# Patient Record
Sex: MALE | Race: ASIAN | NOT HISPANIC OR LATINO | Employment: FULL TIME | URBAN - METROPOLITAN AREA
[De-identification: names, ages, dates, MRNs, and addresses within clinical notes are randomized per-mention and may not be internally consistent; named-entity substitution may affect disease eponyms.]

---

## 2018-06-29 ENCOUNTER — OFFICE VISIT - HEALTHEAST (OUTPATIENT)
Dept: FAMILY MEDICINE | Facility: CLINIC | Age: 53
End: 2018-06-29

## 2018-06-29 DIAGNOSIS — Z00.8 ENCOUNTER FOR BIOMETRIC SCREENING: ICD-10-CM

## 2018-06-29 LAB
ALBUMIN SERPL-MCNC: 4.1 G/DL (ref 3.5–5)
ALP SERPL-CCNC: 77 U/L (ref 45–120)
ALT SERPL W P-5'-P-CCNC: 36 U/L (ref 0–45)
ANION GAP SERPL CALCULATED.3IONS-SCNC: 11 MMOL/L (ref 5–18)
AST SERPL W P-5'-P-CCNC: 29 U/L (ref 0–40)
BILIRUB SERPL-MCNC: 0.3 MG/DL (ref 0–1)
BUN SERPL-MCNC: 18 MG/DL (ref 8–22)
CALCIUM SERPL-MCNC: 9.3 MG/DL (ref 8.5–10.5)
CHLORIDE BLD-SCNC: 110 MMOL/L (ref 98–107)
CHOLEST SERPL-MCNC: 133 MG/DL
CO2 SERPL-SCNC: 22 MMOL/L (ref 22–31)
CREAT SERPL-MCNC: 0.97 MG/DL (ref 0.7–1.3)
FASTING STATUS PATIENT QL REPORTED: NO
GFR SERPL CREATININE-BSD FRML MDRD: >60 ML/MIN/1.73M2
GLUCOSE BLD-MCNC: 88 MG/DL (ref 70–125)
HDLC SERPL-MCNC: 43 MG/DL
LDLC SERPL CALC-MCNC: 75 MG/DL
POTASSIUM BLD-SCNC: 4 MMOL/L (ref 3.5–5)
PROT SERPL-MCNC: 7.4 G/DL (ref 6–8)
SODIUM SERPL-SCNC: 143 MMOL/L (ref 136–145)
TRIGL SERPL-MCNC: 76 MG/DL

## 2018-06-29 ASSESSMENT — MIFFLIN-ST. JEOR: SCORE: 1501.98

## 2018-07-02 ENCOUNTER — COMMUNICATION - HEALTHEAST (OUTPATIENT)
Dept: FAMILY MEDICINE | Facility: CLINIC | Age: 53
End: 2018-07-02

## 2019-07-01 ENCOUNTER — OFFICE VISIT - HEALTHEAST (OUTPATIENT)
Dept: FAMILY MEDICINE | Facility: CLINIC | Age: 54
End: 2019-07-01

## 2019-07-01 ENCOUNTER — COMMUNICATION - HEALTHEAST (OUTPATIENT)
Dept: TELEHEALTH | Facility: CLINIC | Age: 54
End: 2019-07-01

## 2019-07-01 DIAGNOSIS — Z00.8 ENCOUNTER FOR BIOMETRIC SCREENING: ICD-10-CM

## 2019-07-01 DIAGNOSIS — Z00.00 ROUTINE GENERAL MEDICAL EXAMINATION AT A HEALTH CARE FACILITY: ICD-10-CM

## 2019-07-01 LAB
ALBUMIN SERPL-MCNC: 4.1 G/DL (ref 3.5–5)
ALP SERPL-CCNC: 58 U/L (ref 45–120)
ALT SERPL W P-5'-P-CCNC: 37 U/L (ref 0–45)
ANION GAP SERPL CALCULATED.3IONS-SCNC: 8 MMOL/L (ref 5–18)
AST SERPL W P-5'-P-CCNC: 25 U/L (ref 0–40)
BASOPHILS # BLD AUTO: 0 THOU/UL (ref 0–0.2)
BASOPHILS NFR BLD AUTO: 0 % (ref 0–2)
BILIRUB SERPL-MCNC: 0.3 MG/DL (ref 0–1)
BUN SERPL-MCNC: 21 MG/DL (ref 8–22)
CALCIUM SERPL-MCNC: 9.5 MG/DL (ref 8.5–10.5)
CHLORIDE BLD-SCNC: 110 MMOL/L (ref 98–107)
CHOLEST SERPL-MCNC: 130 MG/DL
CO2 SERPL-SCNC: 24 MMOL/L (ref 22–31)
CREAT SERPL-MCNC: 1.03 MG/DL (ref 0.7–1.3)
EOSINOPHIL # BLD AUTO: 0.3 THOU/UL (ref 0–0.4)
EOSINOPHIL NFR BLD AUTO: 8 % (ref 0–6)
ERYTHROCYTE [DISTWIDTH] IN BLOOD BY AUTOMATED COUNT: 12.2 % (ref 11–14.5)
FASTING STATUS PATIENT QL REPORTED: NO
GFR SERPL CREATININE-BSD FRML MDRD: >60 ML/MIN/1.73M2
GLUCOSE BLD-MCNC: 89 MG/DL (ref 70–125)
HCT VFR BLD AUTO: 43.4 % (ref 40–54)
HDLC SERPL-MCNC: 42 MG/DL
HGB BLD-MCNC: 14.5 G/DL (ref 14–18)
LDLC SERPL CALC-MCNC: 76 MG/DL
LYMPHOCYTES # BLD AUTO: 1.5 THOU/UL (ref 0.8–4.4)
LYMPHOCYTES NFR BLD AUTO: 37 % (ref 20–40)
MCH RBC QN AUTO: 28.9 PG (ref 27–34)
MCHC RBC AUTO-ENTMCNC: 33.3 G/DL (ref 32–36)
MCV RBC AUTO: 87 FL (ref 80–100)
MONOCYTES # BLD AUTO: 0.4 THOU/UL (ref 0–0.9)
MONOCYTES NFR BLD AUTO: 10 % (ref 2–10)
NEUTROPHILS # BLD AUTO: 1.9 THOU/UL (ref 2–7.7)
NEUTROPHILS NFR BLD AUTO: 46 % (ref 50–70)
PLATELET # BLD AUTO: 243 THOU/UL (ref 140–440)
PMV BLD AUTO: 7.2 FL (ref 7–10)
POTASSIUM BLD-SCNC: 4.9 MMOL/L (ref 3.5–5)
PROT SERPL-MCNC: 7.5 G/DL (ref 6–8)
RBC # BLD AUTO: 5 MILL/UL (ref 4.4–6.2)
SODIUM SERPL-SCNC: 142 MMOL/L (ref 136–145)
TRIGL SERPL-MCNC: 58 MG/DL
WBC: 4.1 THOU/UL (ref 4–11)

## 2019-07-01 ASSESSMENT — MIFFLIN-ST. JEOR: SCORE: 1497.39

## 2019-07-03 ENCOUNTER — COMMUNICATION - HEALTHEAST (OUTPATIENT)
Dept: FAMILY MEDICINE | Facility: CLINIC | Age: 54
End: 2019-07-03

## 2019-08-12 ENCOUNTER — OFFICE VISIT - HEALTHEAST (OUTPATIENT)
Dept: FAMILY MEDICINE | Facility: CLINIC | Age: 54
End: 2019-08-12

## 2019-08-12 DIAGNOSIS — D17.20 LIPOMA OF LOWER EXTREMITY, UNSPECIFIED LATERALITY: ICD-10-CM

## 2019-08-12 DIAGNOSIS — D17.0 LIPOMA OF NECK: ICD-10-CM

## 2019-08-12 DIAGNOSIS — L03.317 CELLULITIS OF BUTTOCK, LEFT: ICD-10-CM

## 2019-08-12 ASSESSMENT — MIFFLIN-ST. JEOR: SCORE: 1473.91

## 2019-08-21 ENCOUNTER — OFFICE VISIT - HEALTHEAST (OUTPATIENT)
Dept: FAMILY MEDICINE | Facility: CLINIC | Age: 54
End: 2019-08-21

## 2019-08-21 DIAGNOSIS — L03.317 CELLULITIS OF BUTTOCK, LEFT: ICD-10-CM

## 2019-08-21 ASSESSMENT — MIFFLIN-ST. JEOR: SCORE: 1491.49

## 2020-08-07 ENCOUNTER — OFFICE VISIT - HEALTHEAST (OUTPATIENT)
Dept: FAMILY MEDICINE | Facility: CLINIC | Age: 55
End: 2020-08-07

## 2020-08-07 DIAGNOSIS — Z00.00 ROUTINE GENERAL MEDICAL EXAMINATION AT A HEALTH CARE FACILITY: ICD-10-CM

## 2020-08-07 ASSESSMENT — MIFFLIN-ST. JEOR: SCORE: 1513.61

## 2020-12-22 ENCOUNTER — OFFICE VISIT - HEALTHEAST (OUTPATIENT)
Dept: FAMILY MEDICINE | Facility: CLINIC | Age: 55
End: 2020-12-22

## 2020-12-22 DIAGNOSIS — R35.0 FREQUENT URINATION: ICD-10-CM

## 2020-12-22 DIAGNOSIS — R31.0 GROSS HEMATURIA: ICD-10-CM

## 2020-12-22 LAB
ALBUMIN UR-MCNC: NEGATIVE MG/DL
ANION GAP SERPL CALCULATED.3IONS-SCNC: 8 MMOL/L (ref 5–18)
APPEARANCE UR: ABNORMAL
BACTERIA #/AREA URNS HPF: ABNORMAL HPF
BILIRUB UR QL STRIP: NEGATIVE
BUN SERPL-MCNC: 16 MG/DL (ref 8–22)
CALCIUM SERPL-MCNC: 9.2 MG/DL (ref 8.5–10.5)
CHLORIDE BLD-SCNC: 104 MMOL/L (ref 98–107)
CO2 SERPL-SCNC: 25 MMOL/L (ref 22–31)
COLOR UR AUTO: ABNORMAL
CREAT SERPL-MCNC: 0.83 MG/DL (ref 0.7–1.3)
GFR SERPL CREATININE-BSD FRML MDRD: >60 ML/MIN/1.73M2
GLUCOSE BLD-MCNC: 90 MG/DL (ref 70–125)
GLUCOSE UR STRIP-MCNC: NEGATIVE MG/DL
HGB BLD-MCNC: 14 G/DL (ref 14–18)
HGB UR QL STRIP: ABNORMAL
KETONES UR STRIP-MCNC: NEGATIVE MG/DL
LEUKOCYTE ESTERASE UR QL STRIP: NEGATIVE
NITRATE UR QL: NEGATIVE
PH UR STRIP: 6 [PH] (ref 5–8)
POTASSIUM BLD-SCNC: 4.5 MMOL/L (ref 3.5–5)
RBC #/AREA URNS AUTO: ABNORMAL HPF
SODIUM SERPL-SCNC: 137 MMOL/L (ref 136–145)
SP GR UR STRIP: 1.01 (ref 1–1.03)
SQUAMOUS #/AREA URNS AUTO: ABNORMAL LPF
UROBILINOGEN UR STRIP-ACNC: ABNORMAL
WBC #/AREA URNS AUTO: ABNORMAL HPF

## 2020-12-23 LAB — BACTERIA SPEC CULT: NO GROWTH

## 2021-05-30 NOTE — PROGRESS NOTES
MALE PREVENTATIVE EXAM    Assessment and Plan:     1. Routine general medical examination at a health care facility  Lipid Profile    HM1(CBC and Differential)    Comprehensive Metabolic Panel    HM1 (CBC with Diff)   2. Encounter for biometric screening       This is a 53 yo male here for physical exam/biometric screening - his workplace encourages regular exam with biometrics.  Did not bring his paperwork today.  No specific problems identified today on history/exam.  Labs ordered as noted.     Discussed colon cancer screening - patient declines today.         Next follow up:  Return in about 1 year (around 7/1/2020) for Annual physical.    Immunization Review  Adult Imm Review: No immunizations due today  not a smoker    I discussed the following with the patient:   Adult Healthy Living: Importance of regular exercise  Healthy nutrition  Getting adequate sleep    I have had an Advance Directives discussion with the patient.  Patient does not have an advanced directive.  Not interested in writing a document currently    Subjective:   Chief Complaint: Kingsley Raphael is an 54 y.o. male here for a preventative health visit.     HPI:  Here for exam - needs labs for workplace (biometric)    Healthy Habits  Are you taking a daily aspirin? No  Do you typically exercising at least 40 min, 3-4 times per week?  NO  Do you usually eat at least 4 servings of fruit and vegetables a day, include whole grains and fiber and avoid regularly eating high fat foods? Yes  Have you had an eye exam in the past two years? NO  Do you see a dentist twice per year? NO  Do you have any concerns regarding sleep? No    Safety Screen  If you own firearms, are they secured in a locked gun cabinet or with trigger locks? NO  Do you feel you are safe where you are living?: Yes (7/1/2019  4:00 PM)  Do you feel you are safe in your relationship(s)?: Yes (7/1/2019  4:00 PM)      Review of Systems:  Please see above.  The rest of the review of systems are  "negative for all systems.     Cancer Screening       Status Date      COLONOSCOPY Overdue 2/3/2017           Patient Care Team:  Provider, No Primary Care as PCP - General        History     Reviewed By Date/Time Sections Reviewed    Leida Martin MD 7/7/2019 11:01 PM Medical, Surgical, Tobacco, Alcohol, Drug Use, Sexual Activity, Family, Social Documentation    Barbie Bojorquez CMA 7/1/2019  4:02 PM Tobacco            Objective:   Vital Signs:   Visit Vitals  /78 (Patient Site: Right Arm, Patient Position: Sitting, Cuff Size: Adult Regular)   Pulse 73   Temp 97.9  F (36.6  C) (Oral)   Resp 18   Ht 5' 7.5\" (1.715 m)   Wt 152 lb 4.8 oz (69.1 kg)   SpO2 98% Comment: ra   BMI 23.50 kg/m           PHYSICAL EXAM  EXAM:  /78 (Patient Site: Right Arm, Patient Position: Sitting, Cuff Size: Adult Regular)   Pulse 73   Temp 97.9  F (36.6  C) (Oral)   Resp 18   Ht 5' 7.5\" (1.715 m)   Wt 152 lb 4.8 oz (69.1 kg)   SpO2 98% Comment: ra  BMI 23.50 kg/m     Gen:  NAD, appears well, well-hydrated  HEENT:  TMs nl, oropharynx benign, nasal mucosa nl, conjunctiva clear  Neck:  Supple, no adenopathy, no thyromegaly, no carotid bruits, no JVD  Lungs:  Clear to auscultation bilaterally  Cor:  RRR no murmur  Abd:  Soft, nontender, BS+, no masses, no guarding or rebound, no HSM  :  Nl male genitalia, no hernia defects  Extr:  Neg.  Neuro:  No asymmetry  Skin:  Warm/dry        The 10-year ASCVD risk score (Loren KEILY Jr., et al., 2013) is: 2.9%    Values used to calculate the score:      Age: 54 years      Sex: Male      Is Non- : No      Diabetic: No      Tobacco smoker: No      Systolic Blood Pressure: 128 mmHg      Is BP treated: No      HDL Cholesterol: 42 mg/dL      Total Cholesterol: 130 mg/dL         Medication List      as of 7/1/2019 11:59 PM     You have not been prescribed any medications.         Additional Screenings Completed Today:     "

## 2021-05-31 NOTE — PROGRESS NOTES
Is better and draining  No fever  On antibiotic         OBJECTIVE:   Vitals:    08/21/19 1537   BP: 104/60   Pulse: 72   Resp: 12      Eyes: non icteric, noninflamed  Lungs: no resp distress  Heart: regular  Ankles: no edema  Muscles: nontender  Mental status: euthymic  Neuro: nonfocal    Body mass index is 23.3 kg/m .   18mm denuded area mild ooze.  Underlying induration without fluctuance.  Not warm.  Size slight larger than golf ball    ASSESSMENT/PLAN:    1. Cellulitis of buttock, left  cephalexin (KEFLEX) 500 MG capsule     Improving and spontaneously draining on soaks and oral antibiotic  Continue same.  Renewed antobiotic.  Daily changes and expect eventual resolution otherwise follow up  I spent 15 minutes with patient face-to-face, of which 50% or greater was spent in counseling and coordination of care in regards to patient's problems and diagnoses as listed above. Please see plan above.

## 2021-05-31 NOTE — PROGRESS NOTES
Bumps  Posterior neck  Left popliteal  Left buttock.  This years and hx pain and squeeze and pus.  Recent swell pain and warm         OBJECTIVE:   Vitals:    08/12/19 1311   BP: 104/72   Pulse: 100   Resp: 20   Temp: 98.9  F (37.2  C)      Eyes: non icteric, noninflamed  Lungs: no resp distress  Heart: regular  Ankles: no edema  Muscles: nontender  Mental status: euthymic  Neuro: nonfocal    Body mass index is 23.01 kg/m .   Lipoma like on neck and left popliteal  Left buttock erythematous warm firm without fluctuance  Nearly baseball diameter.  Minimal distress  Afebrile  ASSESSMENT/PLAN:    1. Cellulitis of buttock, left  cephalexin (KEFLEX) 500 MG capsule   2. Lipoma of neck     3. Lipoma of lower extremity, unspecified laterality       Hot pack and antibiotic and follow up one week to consider I and D and drain

## 2021-06-01 VITALS — HEIGHT: 68 IN | BODY MASS INDEX: 23.23 KG/M2 | WEIGHT: 153.31 LBS

## 2021-06-03 VITALS — HEIGHT: 68 IN | BODY MASS INDEX: 23.08 KG/M2 | WEIGHT: 152.3 LBS

## 2021-06-03 VITALS — WEIGHT: 148 LBS | HEIGHT: 67 IN | BODY MASS INDEX: 23.23 KG/M2

## 2021-06-03 VITALS — WEIGHT: 151 LBS | HEIGHT: 68 IN | BODY MASS INDEX: 22.88 KG/M2

## 2021-06-04 VITALS
DIASTOLIC BLOOD PRESSURE: 86 MMHG | BODY MASS INDEX: 23.49 KG/M2 | RESPIRATION RATE: 24 BRPM | HEIGHT: 68 IN | HEART RATE: 76 BPM | SYSTOLIC BLOOD PRESSURE: 135 MMHG | WEIGHT: 155 LBS | TEMPERATURE: 97.9 F

## 2021-06-05 VITALS
DIASTOLIC BLOOD PRESSURE: 90 MMHG | BODY MASS INDEX: 23.61 KG/M2 | RESPIRATION RATE: 16 BRPM | SYSTOLIC BLOOD PRESSURE: 121 MMHG | WEIGHT: 154.13 LBS | TEMPERATURE: 95.9 F | HEART RATE: 76 BPM

## 2021-06-10 NOTE — PROGRESS NOTES
Assessment:     1. Routine general medical examination at a health care facility     Plan:      All questions answered.  Testicular self exam technique reviewed and patient encouraged to perform self-exam monthly.  Discussed healthy lifestyle modifications.   Declines blood work today.   Declines colon cancer screening. He verbalized understanding of the implications.   Td today.   Encouraged to return when he changes his mind. Encouraged to return for influenza vaccine.   Subjective:      Kingsley Raphael is a 55 y.o. male who presents for an annual exam. The patient reports that there is not domestic violence in his life. He has no concerns.   No recent illness. No recent hospitalization. He declines blood work, colon cancer screening, vaccinations except for tetanus because of his job. He came with his wife who is her for her physical.   He will think about these preventative tasks for next year. Still working in the same shop, socially distanced.     Healthy Habits:   Regular Exercise: Yes  Sunscreen Use: No  Healthy Diet: Yes  Dental Visits Regularly: Yes  Seat Belt: Yes  Sexually active: Yes  Monthly Self Testicular Exams:  No  Hemoccults: No  Flex Sig: No  Colonoscopy: No  Lipid Profile: No  Glucose Screen: No  Prevention of Osteoporosis: No  Last Dexa: No  Guns at Home:  No      Immunization History   Administered Date(s) Administered     DT (pediatric) 01/01/1999     Hep A, Adult IM (19yr & older) 05/06/2005, 01/06/2010     Hep A, historic 05/10/2005     Td, Adult, Absorbed 08/15/1998, 01/01/1999     Td, adult adsorbed, PF 08/07/2020     Td,adult,historic,unspecified 01/01/1999     Tdap 01/06/2010     Typhoid, Inj, Inactive 05/10/2005, 01/06/2010     Immunization status: due today.    No exam data present    No current outpatient medications on file.     No current facility-administered medications for this visit.      History reviewed. No pertinent past medical history.  Past Surgical History:   Procedure  Laterality Date     NO PAST SURGERIES       Patient has no known allergies.  History reviewed. No pertinent family history.  Social History     Socioeconomic History     Marital status: Single     Spouse name: Not on file     Number of children: Not on file     Years of education: Not on file     Highest education level: Not on file   Occupational History     Occupation:    Social Needs     Financial resource strain: Not on file     Food insecurity     Worry: Not on file     Inability: Not on file     Transportation needs     Medical: Not on file     Non-medical: Not on file   Tobacco Use     Smoking status: Current Every Day Smoker     Types: Cigarettes     Smokeless tobacco: Former User   Substance and Sexual Activity     Alcohol use: Yes     Comment: social     Drug use: No     Sexual activity: Yes     Partners: Female   Lifestyle     Physical activity     Days per week: Not on file     Minutes per session: Not on file     Stress: Not on file   Relationships     Social connections     Talks on phone: Not on file     Gets together: Not on file     Attends Voodoo service: Not on file     Active member of club or organization: Not on file     Attends meetings of clubs or organizations: Not on file     Relationship status: Not on file     Intimate partner violence     Fear of current or ex partner: Not on file     Emotionally abused: Not on file     Physically abused: Not on file     Forced sexual activity: Not on file   Other Topics Concern     Not on file   Social History Narrative     - 4 children    Born in [facility] - came to [place]       Review of Systems  General:  Denies problem  Eyes: Denies problem  Ears/Nose/Throat: Denies problem  Cardiovascular: Denies problem  Respiratory:  Denies problem  Gastrointestinal:  Denies problem  Genitourinary: Denies problem  Musculoskeletal:  Denies problem  Skin: Denies problem  Neurologic: Denies problem  Psychiatric: Denies problem  Endocrine: Denies  "problem  Heme/Lymphatic: Denies problem   Allergic/Immunologic: Denies problem        Objective:     Vitals:    08/07/20 0853   BP: 135/86   Pulse: 76   Resp: 24   Temp: 97.9  F (36.6  C)   TempSrc: Tympanic   Weight: 155 lb (70.3 kg)   Height: 5' 7.75\" (1.721 m)     Body mass index is 23.74 kg/m .    Physical  General Appearance: Alert, cooperative, no distress, appears stated age  Head: Normocephalic, without obvious abnormality, atraumatic  Eyes: PERRL, conjunctiva/corneas clear, EOM's intact  Ears: Normal TM's and external ear canals, both ears  Nose: Nares normal, septum midline,mucosa normal, no drainage  Throat: Lips, mucosa, and tongue normal; teeth and gums normal  Neck: Supple, symmetrical, trachea midline, no adenopathy;  thyroid: not enlarged, symmetric, no tenderness/mass/nodules; no carotid bruit or JVD  Back: Symmetric, no curvature, ROM normal, no CVA tenderness  Lungs: Clear to auscultation bilaterally, respirations unlabored  Heart: Regular rate and rhythm, S1 and S2 normal, no murmur, rub, or gallop,  Abdomen: Soft, non-tender, bowel sounds active all four quadrants,  no masses, no organomegaly  Genitourinary:  defers  Musculoskeletal: Normal range of motion. No joint swelling or deformity.   Extremities: Extremities normal, atraumatic, no cyanosis or edema  Skin: Skin color, texture, turgor normal, no rashes or lesions  Lymph nodes: Cervical, supraclavicular, and axillary nodes normal  Neurologic: He is alert. He has normal reflexes.   Psychiatric: He has a normal mood and affect.            "

## 2021-06-13 NOTE — PROGRESS NOTES
Assessment/plan   1. Gross hematuria  2. Frequent urination  1 week history of gross hematuria and symptoms consistent with UTI.  RBCs seen on urinalysis but no other signs of infection.  Given his symptoms, will treat empirically with Bactrim and await urine culture.  If urine culture is negative, would recommend urology referral especially given his longstanding smoking history.  - Hemoglobin  - Basic Metabolic Panel  - Culture, Urine  - Urinalysis-UC if Indicated  - sulfamethoxazole-trimethoprim (BACTRIM DS) 800-160 mg per tablet; Take 1 tablet by mouth 2 (two) times a day for 7 days.  Dispense: 14 tablet; Refill: 0  - Culture, Urine    ADDENDUM--------------  Urine culture revealed no growth so patient instructed to stop antibiotic.  Will place an urgent urology referral and have our staff call him to help schedule.  Reviewed warning signs of when to seek immediate care including worsening abdominal pain, inability to void, fevers.      Verna Michelle DO    Options for treatment and follow-up care were reviewed with the patient. Kingsley Raphael engaged in the decision making process and verbalized understanding of the options discussed and agreed with the final plan.    This note has been dictated using voice recognition software. Any grammatical or context distortions are unintentional and inherent to the software.    Subjective:      HPI: Kingsley Raphael is a 55 y.o. male who is here for:    Chief Complaint   Patient presents with     Hematuria     pt states he started peeing red x 1 wk.      A little over a week ago patient started noticing bright red urine, cherry red in color.  At first he thought it was related to red wine he had drank the night before however persisted.  It occurs every time he urinates.  He also noticed increased frequency and hesitancy.  His lower abdomen feels tight like he has to urinate but does not feel necessarily painful.  He does not have any new back pain, pain in the penis or testes,  penile discharge or penile lesions.  He has no new sexual contacts and no history of kidney stones.  He is an everyday smoker approximately 1 pack/day since 1978.    Medical History:  There is no problem list on file for this patient.    No past medical history on file.    Medications:  Current Outpatient Medications   Medication Sig Dispense Refill     sulfamethoxazole-trimethoprim (BACTRIM DS) 800-160 mg per tablet Take 1 tablet by mouth 2 (two) times a day for 7 days. 14 tablet 0     No current facility-administered medications for this visit.        Objective:    /90 (Patient Site: Left Arm, Patient Position: Sitting, Cuff Size: Adult Regular)   Pulse 76   Temp (!) 95.9  F (35.5  C) (Oral)   Resp 16   Wt 154 lb 2 oz (69.9 kg)   BMI 23.61 kg/m      Physical Exam:   General: Alert, pleasant, cooperative, NAD  HEENT: NC/AT, EOMI, PERRL, normal conjunctivae and sclerae  CV: RRR, no murmurs, rubs or gallops, 2+ peripheral pulses  Respiratory: normal effort, lungs CTAB with good aeration throughout  Abdomen:  soft,  ND, NT, states he feels like he needs to urinate when I press suprapubically  Back: No CVA tenderness  Psych: mood neutral and affect appropriate    Recent Results (from the past 24 hour(s))   Urinalysis-UC if Indicated   Result Value Ref Range    Color, UA Monik (!) Colorless, Yellow, Straw, Light Yellow    Clarity, UA Slightly Cloudy (!) Clear    Glucose, UA Negative Negative    Bilirubin, UA Negative Negative    Ketones, UA Negative Negative    Specific Gravity, UA 1.015 1.005 - 1.030    Blood, UA Large (!) Negative    pH, UA 6.0 5.0 - 8.0    Protein, UA Negative Negative mg/dL    Urobilinogen, UA 0.2 E.U./dL 0.2 E.U./dL, 1.0 E.U./dL    Nitrite, UA Negative Negative    Leukocytes, UA Negative Negative    Bacteria, UA None Seen None Seen hpf    RBC, UA 10-25 (!) None Seen, 0-2 hpf    WBC, UA None Seen None Seen, 0-5 hpf    Squam Epithel, UA None Seen None Seen, 0-5 lpf

## 2021-06-19 NOTE — PROGRESS NOTES
"ASSESSMENT/PLAN:  1. Encounter for biometric screening  Comprehensive Metabolic Panel    Lipid Profile       This is a 52 yo male seen for biometric screening.  He is a new patient to the clinic today.  We collected medical history.  Labs are drawn (no paperwork was available to me today)    Discussed health maintenance - patient adamantly declines colonoscopy.  (or any colon cancer screening)        There are no discontinued medications.  There are no Patient Instructions on file for this visit.    Chief Complaint:  Chief Complaint   Patient presents with     Annual Exam     52 yo, nonfasting, Biometric screening for work       HPI:   Kingsley Raphael is a 53 y.o. male c/o  New patient  Needs biometric screening due to insurance  Didn't bring paperwork today  Doesn't know what he really needs for screening    PMH:   There are no active problems to display for this patient.    History reviewed. No pertinent past medical history.  Past Surgical History:   Procedure Laterality Date     NO PAST SURGERIES       Social History     Social History     Marital status: Single     Spouse name: N/A     Number of children: N/A     Years of education: N/A     Occupational History           Social History Main Topics     Smoking status: Never Smoker     Smokeless tobacco: Never Used     Alcohol use Yes      Comment: social     Drug use: No     Sexual activity: Yes     Partners: Female     Other Topics Concern     Not on file     Social History Narrative     - 4 children    Born in [facility] - [place]           Meds:  No current outpatient prescriptions on file.    Allergies:  No Known Allergies    ROS:  Pertinent positives as noted in HPI; otherwise 12 point ROS negative.      Physical Exam:  EXAM:  /70 (Patient Site: Right Arm, Patient Position: Sitting, Cuff Size: Adult Regular)  Pulse 76  Temp 98  F (36.7  C) (Oral)   Resp 20  Ht 5' 7.5\" (1.715 m)  Wt 153 lb 5 oz (69.5 kg)  BMI 23.66 kg/m2   Gen:  " NAD, appears well, well-hydrated  HEENT:  TMs nl, oropharynx benign, nasal mucosa nl, conjunctiva clear  Neck:  Supple, no adenopathy, no thyromegaly, no carotid bruits, no JVD  Lungs:  Clear to auscultation bilaterally  Cor:  RRR no murmur  Abd:  Soft, nontender, BS+, no masses, no guarding or rebound, no HSM  Extr:  Neg.  Neuro:  No asymmetry, Nl motor tone/strength, nl sensation, reflexes =, gait nl, nl coordination, CN intact,   Skin:  Warm/dry        Results:  Results for orders placed or performed in visit on 06/29/18   Comprehensive Metabolic Panel   Result Value Ref Range    Sodium 143 136 - 145 mmol/L    Potassium 4.0 3.5 - 5.0 mmol/L    Chloride 110 (H) 98 - 107 mmol/L    CO2 22 22 - 31 mmol/L    Anion Gap, Calculation 11 5 - 18 mmol/L    Glucose 88 70 - 125 mg/dL    BUN 18 8 - 22 mg/dL    Creatinine 0.97 0.70 - 1.30 mg/dL    GFR MDRD Af Amer >60 >60 mL/min/1.73m2    GFR MDRD Non Af Amer >60 >60 mL/min/1.73m2    Bilirubin, Total 0.3 0.0 - 1.0 mg/dL    Calcium 9.3 8.5 - 10.5 mg/dL    Protein, Total 7.4 6.0 - 8.0 g/dL    Albumin 4.1 3.5 - 5.0 g/dL    Alkaline Phosphatase 77 45 - 120 U/L    AST 29 0 - 40 U/L    ALT 36 0 - 45 U/L   Lipid Profile   Result Value Ref Range    Triglycerides 76 <=149 mg/dL    Cholesterol 133 <=199 mg/dL    LDL Calculated 75 <=129 mg/dL    HDL Cholesterol 43 >=40 mg/dL    Patient Fasting > 8hrs? No

## 2021-06-19 NOTE — LETTER
Letter by Leida Martin MD at      Author: Leida Martin MD Service: -- Author Type: --    Filed:  Encounter Date: 7/3/2019 Status: (Other)         Kingsley Raphael  [address] [address]             July 3, 2019         Dear Mr. Raphael,    Below are the results from your recent visit:    Resulted Orders   Lipid Profile   Result Value Ref Range    Triglycerides 58 <=149 mg/dL    Cholesterol 130 <=199 mg/dL    LDL Calculated 76 <=129 mg/dL    HDL Cholesterol 42 >=40 mg/dL    Patient Fasting > 8hrs? No    Comprehensive Metabolic Panel   Result Value Ref Range    Sodium 142 136 - 145 mmol/L    Potassium 4.9 3.5 - 5.0 mmol/L    Chloride 110 (H) 98 - 107 mmol/L    CO2 24 22 - 31 mmol/L    Anion Gap, Calculation 8 5 - 18 mmol/L    Glucose 89 70 - 125 mg/dL    BUN 21 8 - 22 mg/dL    Creatinine 1.03 0.70 - 1.30 mg/dL    GFR MDRD Af Amer >60 >60 mL/min/1.73m2    GFR MDRD Non Af Amer >60 >60 mL/min/1.73m2    Bilirubin, Total 0.3 0.0 - 1.0 mg/dL    Calcium 9.5 8.5 - 10.5 mg/dL    Protein, Total 7.5 6.0 - 8.0 g/dL    Albumin 4.1 3.5 - 5.0 g/dL    Alkaline Phosphatase 58 45 - 120 U/L    AST 25 0 - 40 U/L    ALT 37 0 - 45 U/L    Narrative    Fasting Glucose reference range is 70-99 mg/dL per  American Diabetes Association (ADA) guidelines.   HM1 (CBC with Diff)   Result Value Ref Range    WBC 4.1 4.0 - 11.0 thou/uL    RBC 5.00 4.40 - 6.20 mill/uL    Hemoglobin 14.5 14.0 - 18.0 g/dL    Hematocrit 43.4 40.0 - 54.0 %    MCV 87 80 - 100 fL    MCH 28.9 27.0 - 34.0 pg    MCHC 33.3 32.0 - 36.0 g/dL    RDW 12.2 11.0 - 14.5 %    Platelets 243 140 - 440 thou/uL    MPV 7.2 7.0 - 10.0 fL    Neutrophils % 46 (L) 50 - 70 %    Lymphocytes % 37 20 - 40 %    Monocytes % 10 2 - 10 %    Eosinophils % 8 (H) 0 - 6 %    Basophils % 0 0 - 2 %    Neutrophils Absolute 1.9 (L) 2.0 - 7.7 thou/uL    Lymphocytes Absolute 1.5 0.8 - 4.4 thou/uL    Monocytes Absolute 0.4 0.0 - 0.9 thou/uL    Eosinophils Absolute 0.3 0.0 - 0.4  thou/uL    Basophils Absolute 0.0 0.0 - 0.2 thou/uL       Labs look good!    Please call with questions or contact us using [facility].    Sincerely,        Electronically signed by Leida Martin MD

## 2021-07-03 NOTE — ADDENDUM NOTE
Addendum Note by Ginna Haile DO at 12/22/2020  1:40 PM     Author: Ginna Haile DO Service: -- Author Type: Physician    Filed: 12/23/2020  2:21 PM Encounter Date: 12/22/2020 Status: Signed    : Ginna Haile DO (Physician)    Addended by: GINNA HAILE on: 12/23/2020 02:21 PM        Modules accepted: Orders

## 2021-07-12 ENCOUNTER — OFFICE VISIT (OUTPATIENT)
Dept: FAMILY MEDICINE | Facility: CLINIC | Age: 56
End: 2021-07-12
Payer: COMMERCIAL

## 2021-07-12 VITALS
BODY MASS INDEX: 23.72 KG/M2 | HEIGHT: 68 IN | WEIGHT: 156.5 LBS | TEMPERATURE: 98.3 F | RESPIRATION RATE: 12 BRPM | HEART RATE: 63 BPM | DIASTOLIC BLOOD PRESSURE: 75 MMHG | SYSTOLIC BLOOD PRESSURE: 114 MMHG

## 2021-07-12 DIAGNOSIS — Z13.220 LIPID SCREENING: ICD-10-CM

## 2021-07-12 DIAGNOSIS — Z11.4 SCREENING FOR HUMAN IMMUNODEFICIENCY VIRUS WITHOUT PRESENCE OF RISK FACTORS: ICD-10-CM

## 2021-07-12 DIAGNOSIS — Z11.59 NEED FOR HEPATITIS C SCREENING TEST: ICD-10-CM

## 2021-07-12 DIAGNOSIS — Z72.0 TOBACCO ABUSE: ICD-10-CM

## 2021-07-12 DIAGNOSIS — Z00.00 ADULT GENERAL MEDICAL EXAM: Primary | ICD-10-CM

## 2021-07-12 LAB
ALBUMIN SERPL-MCNC: 4 G/DL (ref 3.5–5)
ALP SERPL-CCNC: 69 U/L (ref 45–120)
ALT SERPL W P-5'-P-CCNC: 41 U/L (ref 0–45)
ANION GAP SERPL CALCULATED.3IONS-SCNC: 11 MMOL/L (ref 5–18)
AST SERPL W P-5'-P-CCNC: 38 U/L (ref 0–40)
BILIRUB SERPL-MCNC: 0.4 MG/DL (ref 0–1)
BUN SERPL-MCNC: 18 MG/DL (ref 8–22)
CALCIUM SERPL-MCNC: 9.1 MG/DL (ref 8.5–10.5)
CHLORIDE BLD-SCNC: 109 MMOL/L (ref 98–107)
CHOLEST SERPL-MCNC: 132 MG/DL
CO2 SERPL-SCNC: 25 MMOL/L (ref 22–31)
CREAT SERPL-MCNC: 0.98 MG/DL (ref 0.7–1.3)
FASTING STATUS PATIENT QL REPORTED: NORMAL
GFR SERPL CREATININE-BSD FRML MDRD: 87 ML/MIN/1.73M2
GLUCOSE BLD-MCNC: 80 MG/DL (ref 70–125)
HDLC SERPL-MCNC: 42 MG/DL
HIV 1+2 AB+HIV1 P24 AG SERPL QL IA: NEGATIVE
LDLC SERPL CALC-MCNC: 81 MG/DL
POTASSIUM BLD-SCNC: 4.8 MMOL/L (ref 3.5–5)
PROT SERPL-MCNC: 7.1 G/DL (ref 6–8)
PSA SERPL-MCNC: 0.76 UG/L (ref 0–3.5)
SODIUM SERPL-SCNC: 145 MMOL/L (ref 136–145)
TRIGL SERPL-MCNC: 47 MG/DL

## 2021-07-12 PROCEDURE — 99396 PREV VISIT EST AGE 40-64: CPT | Performed by: FAMILY MEDICINE

## 2021-07-12 PROCEDURE — 86803 HEPATITIS C AB TEST: CPT | Performed by: FAMILY MEDICINE

## 2021-07-12 PROCEDURE — 87389 HIV-1 AG W/HIV-1&-2 AB AG IA: CPT | Performed by: FAMILY MEDICINE

## 2021-07-12 PROCEDURE — G0103 PSA SCREENING: HCPCS | Performed by: FAMILY MEDICINE

## 2021-07-12 PROCEDURE — 80061 LIPID PANEL: CPT | Performed by: FAMILY MEDICINE

## 2021-07-12 PROCEDURE — 36415 COLL VENOUS BLD VENIPUNCTURE: CPT | Performed by: FAMILY MEDICINE

## 2021-07-12 PROCEDURE — 80053 COMPREHEN METABOLIC PANEL: CPT | Performed by: FAMILY MEDICINE

## 2021-07-12 ASSESSMENT — MIFFLIN-ST. JEOR: SCORE: 1516.76

## 2021-07-12 NOTE — PROGRESS NOTES
SUBJECTIVE:   CC: Kingsley Raphael is an 56 year old male who presents for preventative health visit.       Patient has been advised of split billing requirements and indicates understanding: Yes  Healthy Habits:     Getting at least 3 servings of Calcium per day:  Yes    Bi-annual eye exam:  Yes    Dental care twice a year:  Yes    Sleep apnea or symptoms of sleep apnea:  None    Diet:  Regular (no restrictions)    Frequency of exercise:  1 day/week    Duration of exercise:  Less than 15 minutes    Taking medications regularly:  Yes    Medication side effects:  Not applicable    PHQ-2 Total Score: 0    Additional concerns today:  No       Has left kidney stone - had gross hematuria in December; passed in march   No previous stone -   No meds  Not much exercise lately - walking at work  10-12 hours/day - checking machine  Refuses colonoscopy -   Drinks coffee/ smokes cigarettes  Weight stable within 4-5 pounds  -          Today's PHQ-2 Score:   PHQ-2 ( 1999 Pfizer) 7/12/2021   Q1: Little interest or pleasure in doing things 0   Q2: Feeling down, depressed or hopeless 0   PHQ-2 Score 0   Q1: Little interest or pleasure in doing things Not at all   Q2: Feeling down, depressed or hopeless Not at all   PHQ-2 Score 0       Abuse: Current or Past(Physical, Sexual or Emotional)- No  Do you feel safe in your environment? Yes        Social History     Tobacco Use     Smoking status: Current Every Day Smoker     Types: Cigarettes, Cigarettes     Smokeless tobacco: Former User   Substance Use Topics     Alcohol use: Yes     Comment: Alcoholic Drinks/day: social     If you drink alcohol do you typically have >3 drinks per day or >7 drinks per week? No    Alcohol Use 7/12/2021   Prescreen: >3 drinks/day or >7 drinks/week? No   Prescreen: >3 drinks/day or >7 drinks/week? -   No flowsheet data found.    Last PSA:   Prostate Specific Antigen Screen   Date Value Ref Range Status   07/12/2021 0.76 0.00 - 3.50 ug/L Final       Reviewed  "orders with patient. Reviewed health maintenance and updated orders accordingly - Yes  BP Readings from Last 3 Encounters:   07/12/21 114/75   12/22/20 (!) 121/90   08/07/20 135/86    Wt Readings from Last 3 Encounters:   07/12/21 71 kg (156 lb 8 oz)   12/22/20 69.9 kg (154 lb 2 oz)   08/07/20 70.3 kg (155 lb)                    Reviewed and updated as needed this visit by clinical staff  Tobacco  Allergies  Meds              Reviewed and updated as needed this visit by Provider                No past medical history on file.   Past Surgical History:   Procedure Laterality Date     NO PAST SURGERIES       OB History   No obstetric history on file.       Review of Systems   Constitutional: Negative for chills and fever.   HENT: Negative for congestion, ear pain, hearing loss and sore throat.    Eyes: Negative for pain and visual disturbance.   Respiratory: Negative for cough and shortness of breath.    Cardiovascular: Negative for chest pain, palpitations and peripheral edema.   Gastrointestinal: Negative for abdominal pain, constipation, diarrhea, heartburn, hematochezia and nausea.   Genitourinary: Negative for discharge, dysuria, frequency, genital sores, hematuria, impotence and urgency.   Musculoskeletal: Negative for arthralgias, joint swelling and myalgias.   Skin: Negative for rash.   Neurological: Negative for dizziness, weakness, headaches and paresthesias.   Psychiatric/Behavioral: Negative for mood changes. The patient is not nervous/anxious.          OBJECTIVE:   /75 (BP Location: Right arm, Patient Position: Sitting, Cuff Size: Adult Small)   Pulse 63   Temp 98.3  F (36.8  C) (Temporal)   Resp 12   Ht 1.715 m (5' 7.52\")   Wt 71 kg (156 lb 8 oz)   BMI 24.14 kg/m      Physical Exam  Vitals reviewed.   Constitutional:       General: He is not in acute distress.     Appearance: Normal appearance.   HENT:      Head: Normocephalic.      Right Ear: Tympanic membrane, ear canal and external ear " normal.      Left Ear: Tympanic membrane, ear canal and external ear normal.      Nose: Nose normal.      Mouth/Throat:      Mouth: Mucous membranes are moist.      Pharynx: No posterior oropharyngeal erythema.   Eyes:      Extraocular Movements: Extraocular movements intact.      Conjunctiva/sclera: Conjunctivae normal.      Pupils: Pupils are equal, round, and reactive to light.   Cardiovascular:      Rate and Rhythm: Normal rate and regular rhythm.      Pulses: Normal pulses.      Heart sounds: Normal heart sounds. No murmur heard.     Pulmonary:      Effort: Pulmonary effort is normal.      Breath sounds: Normal breath sounds.   Abdominal:      Palpations: Abdomen is soft. There is no mass.      Tenderness: There is no abdominal tenderness. There is no guarding or rebound.   Genitourinary:     Penis: Normal.       Testes: Normal.   Musculoskeletal:         General: No deformity. Normal range of motion.      Cervical back: Normal range of motion and neck supple.   Lymphadenopathy:      Cervical: No cervical adenopathy.   Skin:     General: Skin is warm and dry.   Neurological:      General: No focal deficit present.      Mental Status: He is alert and oriented to person, place, and time.   Psychiatric:         Mood and Affect: Mood normal.         Behavior: Behavior normal.           Diagnostic Test Results:  Labs reviewed in Epic  Results for orders placed or performed in visit on 07/12/21   HIV Antigen Antibody Combo     Status: Normal   Result Value Ref Range    HIV Antigen Antibody Combo Negative Negative   Hepatitis C Screen Reflex to HCV RNA Quant and Genotype     Status: Normal   Result Value Ref Range    Hepatitis C Antibody Nonreactive Nonreactive    Narrative    Assay performance characteristics have not been established for newborns, infants, and children.   PSA, screen     Status: Normal   Result Value Ref Range    Prostate Specific Antigen Screen 0.76 0.00 - 3.50 ug/L   Comprehensive metabolic panel  (BMP + Alb, Alk Phos, ALT, AST, Total. Bili, TP)     Status: Abnormal   Result Value Ref Range    Sodium 145 136 - 145 mmol/L    Potassium 4.8 3.5 - 5.0 mmol/L    Chloride 109 (H) 98 - 107 mmol/L    Carbon Dioxide (CO2) 25 22 - 31 mmol/L    Anion Gap 11 5 - 18 mmol/L    Urea Nitrogen 18 8 - 22 mg/dL    Creatinine 0.98 0.70 - 1.30 mg/dL    Calcium 9.1 8.5 - 10.5 mg/dL    Glucose 80 70 - 125 mg/dL    Alkaline Phosphatase 69 45 - 120 U/L    AST 38 0 - 40 U/L    ALT 41 0 - 45 U/L    Protein Total 7.1 6.0 - 8.0 g/dL    Albumin 4.0 3.5 - 5.0 g/dL    Bilirubin Total 0.4 0.0 - 1.0 mg/dL    GFR Estimate 87 >60 mL/min/1.73m2   Lipid Profile (Chol, Trig, HDL, LDL calc)     Status: None   Result Value Ref Range    Cholesterol 132 <=199 mg/dL    Triglycerides 47 <=149 mg/dL    Direct Measure HDL 42 >=40 mg/dL    LDL Cholesterol Calculated 81 <=129 mg/dL    Patient Fasting > 8hrs? Unknown        ASSESSMENT/PLAN:   (Z00.00) Adult general medical exam  (primary encounter diagnosis)  Comment: here for physical exam - no specific concerns  Plan: REVIEW OF HEALTH MAINTENANCE PROTOCOL ORDERS,         PSA, screen, Comprehensive metabolic panel (BMP        + Alb, Alk Phos, ALT, AST, Total. Bili, TP),         Lipid Profile (Chol, Trig, HDL, LDL calc)        Labs ordered - will review    (Z72.0) Tobacco abuse  Comment: still smoking - discussed  Plan: encourage cessation     (Z13.220) Lipid screening  Comment: due for screening  Plan: ordered lipid profile    (Z11.4) Screening for human immunodeficiency virus without presence of risk factors  Comment: discussed recommendation  Plan: HIV Antigen Antibody Combo        ordered    (Z11.59) Need for hepatitis C screening test  Comment: discussed recommendation   Plan: Hepatitis C Screen Reflex to HCV RNA Quant and         Genotype        ordered      Patient has been advised of split billing requirements and indicates understanding: Yes  COUNSELING:   Reviewed preventive health counseling, as  "reflected in patient instructions       Regular exercise       Healthy diet/nutrition       Vision screening       Hearing screening    Estimated body mass index is 24.14 kg/m  as calculated from the following:    Height as of this encounter: 1.715 m (5' 7.52\").    Weight as of this encounter: 71 kg (156 lb 8 oz).     Weight management plan: Discussed healthy diet and exercise guidelines    He reports that he has been smoking cigarettes and cigarettes. He has quit using smokeless tobacco.  Tobacco Cessation Action Plan:   Information offered: Patient not interested at this time      Counseling Resources:  ATP IV Guidelines  Pooled Cohorts Equation Calculator  FRAX Risk Assessment  ICSI Preventive Guidelines  Dietary Guidelines for Americans, 2010  [employer] My[employer]ate  ASA Prophylaxis  Lung CA Screening    ALISTAIR STERLING MD  [facility]  "

## 2021-07-12 NOTE — LETTER
August 23, 2021      Kingsley Raphael  [address]  [place] [address]        Dear ,    We are writing to inform you of your test results.    Your test results fall within the expected range(s) or remain unchanged from previous results.  Please continue with current treatment plan.    Resulted Orders   HIV Antigen Antibody Combo   Result Value Ref Range    HIV Antigen Antibody Combo Negative Negative   Hepatitis C Screen Reflex to HCV RNA Quant and Genotype   Result Value Ref Range    Hepatitis C Antibody Nonreactive Nonreactive    Narrative    Assay performance characteristics have not been established for newborns, infants, and children.   PSA, screen   Result Value Ref Range    Prostate Specific Antigen Screen 0.76 0.00 - 3.50 ug/L   Comprehensive metabolic panel (BMP + Alb, Alk Phos, ALT, AST, Total. Bili, TP)   Result Value Ref Range    Sodium 145 136 - 145 mmol/L    Potassium 4.8 3.5 - 5.0 mmol/L    Chloride 109 (H) 98 - 107 mmol/L    Carbon Dioxide (CO2) 25 22 - 31 mmol/L    Anion Gap 11 5 - 18 mmol/L    Urea Nitrogen 18 8 - 22 mg/dL    Creatinine 0.98 0.70 - 1.30 mg/dL    Calcium 9.1 8.5 - 10.5 mg/dL    Glucose 80 70 - 125 mg/dL    Alkaline Phosphatase 69 45 - 120 U/L    AST 38 0 - 40 U/L    ALT 41 0 - 45 U/L    Protein Total 7.1 6.0 - 8.0 g/dL    Albumin 4.0 3.5 - 5.0 g/dL    Bilirubin Total 0.4 0.0 - 1.0 mg/dL    GFR Estimate 87 >60 mL/min/1.73m2      Comment:      As of July 11, 2021, eGFR is calculated by the CKD-EPI creatinine equation, without race adjustment. eGFR can be influenced by muscle mass, exercise, and diet. The reported eGFR is an estimation only and is only applicable if the renal function is stable.   Lipid Profile (Chol, Trig, HDL, LDL calc)   Result Value Ref Range    Cholesterol 132 <=199 mg/dL    Triglycerides 47 <=149 mg/dL    Direct Measure HDL 42 >=40 mg/dL      Comment:      HDL Cholesterol Reference Range:     0-2 years:   No reference ranges established for patients under  2 years old  at [facility] for lipid analytes.    2-8 years:  Greater than 45 mg/dL     18 years and older:   Female: Greater than or equal to 50 mg/dL   Male:   Greater than or equal to 40 mg/dL    LDL Cholesterol Calculated 81 <=129 mg/dL    Patient Fasting > 8hrs? Unknown        If you have any questions or concerns, please call the clinic at the number listed above.       Sincerely,      Leida Martin MD

## 2021-07-14 LAB — HCV AB SERPL QL IA: NONREACTIVE

## 2021-07-18 ASSESSMENT — ENCOUNTER SYMPTOMS
JOINT SWELLING: 0
WEAKNESS: 0
ARTHRALGIAS: 0
NERVOUS/ANXIOUS: 0
DIARRHEA: 0
MYALGIAS: 0
CONSTIPATION: 0
FREQUENCY: 0
DIZZINESS: 0
HEADACHES: 0
PALPITATIONS: 0
FEVER: 0
COUGH: 0
SORE THROAT: 0
SHORTNESS OF BREATH: 0
DYSURIA: 0
EYE PAIN: 0
PARESTHESIAS: 0
ABDOMINAL PAIN: 0
CHILLS: 0
HEARTBURN: 0
HEMATURIA: 0
HEMATOCHEZIA: 0
NAUSEA: 0

## 2022-07-15 ENCOUNTER — OFFICE VISIT (OUTPATIENT)
Dept: FAMILY MEDICINE | Facility: CLINIC | Age: 57
End: 2022-07-15
Payer: COMMERCIAL

## 2022-07-15 VITALS
RESPIRATION RATE: 14 BRPM | WEIGHT: 158 LBS | HEART RATE: 86 BPM | BODY MASS INDEX: 23.95 KG/M2 | DIASTOLIC BLOOD PRESSURE: 87 MMHG | SYSTOLIC BLOOD PRESSURE: 126 MMHG | HEIGHT: 68 IN | TEMPERATURE: 97.9 F

## 2022-07-15 DIAGNOSIS — F17.210 CIGARETTE NICOTINE DEPENDENCE WITHOUT COMPLICATION: ICD-10-CM

## 2022-07-15 DIAGNOSIS — R73.03 PRE-DIABETES: ICD-10-CM

## 2022-07-15 DIAGNOSIS — Z00.00 ANNUAL PHYSICAL EXAM: Primary | ICD-10-CM

## 2022-07-15 LAB
CHOLEST SERPL-MCNC: 139 MG/DL
HBA1C MFR BLD: 5.7 % (ref 0–5.6)
HDLC SERPL-MCNC: 43 MG/DL
LDLC SERPL CALC-MCNC: 86 MG/DL
NONHDLC SERPL-MCNC: 96 MG/DL
TRIGL SERPL-MCNC: 50 MG/DL

## 2022-07-15 PROCEDURE — 83036 HEMOGLOBIN GLYCOSYLATED A1C: CPT | Performed by: PHYSICIAN ASSISTANT

## 2022-07-15 PROCEDURE — 99396 PREV VISIT EST AGE 40-64: CPT | Performed by: PHYSICIAN ASSISTANT

## 2022-07-15 PROCEDURE — 36415 COLL VENOUS BLD VENIPUNCTURE: CPT | Performed by: PHYSICIAN ASSISTANT

## 2022-07-15 PROCEDURE — 80061 LIPID PANEL: CPT | Performed by: PHYSICIAN ASSISTANT

## 2022-07-15 ASSESSMENT — ENCOUNTER SYMPTOMS
SHORTNESS OF BREATH: 0
HEARTBURN: 0
HEADACHES: 0
HEMATOCHEZIA: 0
CONSTIPATION: 0
SORE THROAT: 0
MYALGIAS: 0
DIZZINESS: 0
EYE PAIN: 0
ABDOMINAL PAIN: 0
CHILLS: 0
WEAKNESS: 0
JOINT SWELLING: 0
COUGH: 0
HEMATURIA: 0
DIARRHEA: 0
FEVER: 0
PALPITATIONS: 0
DYSURIA: 0
PARESTHESIAS: 0
NAUSEA: 0
ARTHRALGIAS: 0
FREQUENCY: 0
NERVOUS/ANXIOUS: 0

## 2022-07-15 NOTE — PROGRESS NOTES
SUBJECTIVE    Kingsley Raphael is a 57 year old male who presents for an annual exam.    Other concerns today:  He has no concerns.  Generally healthy.  Not taking any medicines.    Patient Active Problem List    Diagnosis Date Noted     Pre-diabetes 07/15/2022     Priority: Medium        Immunization History   Administered Date(s) Administered     COVID-19,PF,Pfizer (12+ Yrs) 05/06/2021, 05/27/2021     COVID-19,PF,Pfizer 12+ Yrs (2022 and After) 02/16/2022     DT (PEDS <7y) 01/01/1999     HepA, Unspecified 05/10/2005     HepA-Adult 05/06/2005, 01/06/2010     TD (ADULT, 7+) 08/07/2020     Td (Adult), Adsorbed 08/15/1998, 01/01/1999     Td,adult,historic,unspecified 01/01/1999     Tdap (Adacel,Boostrix) 01/06/2010     Typhoid IM 05/10/2005, 01/06/2010       No current outpatient medications on file.     No current facility-administered medications for this visit.       No past medical history on file.    Past Surgical History:   Procedure Laterality Date     NO PAST SURGERIES          No family history on file.     Patient has been advised of split billing requirements and indicates understanding: Yes  Healthy Habits:     Getting at least 3 servings of Calcium per day:  Yes    Bi-annual eye exam:  Yes    Dental care twice a year:  Yes    Sleep apnea or symptoms of sleep apnea:  None    Diet:  Regular (no restrictions)    Frequency of exercise:  None    Taking medications regularly:  Not Applicable    Medication side effects:  Not applicable    PHQ-2 Total Score: 0    Additional concerns today:  No      Today's PHQ-2 Score:   PHQ-2 ( 1999 Pfizer) 7/15/2022   Q1: Little interest or pleasure in doing things 0   Q2: Feeling down, depressed or hopeless 0   PHQ-2 Score 0   PHQ-2 Total Score (12-17 Years)- Positive if 3 or more points; Administer PHQ-A if positive -   Q1: Little interest or pleasure in doing things Not at all   Q2: Feeling down, depressed or hopeless Not at all   PHQ-2 Score 0     Abuse: Current or  "Past(Physical, Sexual or Emotional)- No  Do you feel safe in your environment? Yes      Alcohol Use 7/15/2022   Prescreen: >3 drinks/day or >7 drinks/week? No   Prescreen: >3 drinks/day or >7 drinks/week? -     Last PSA:   Prostate Specific Antigen Screen   Date Value Ref Range Status   07/12/2021 0.76 0.00 - 3.50 ug/L Final     Complete review of systems reviewed with patient, and is negative except as noted in HPI.        OBJECTIVE    Vitals:    07/15/22 0821   BP: 126/87   BP Location: Right arm   Patient Position: Sitting   Cuff Size: Adult Regular   Pulse: 86   Resp: 14   Temp: 97.9  F (36.6  C)   TempSrc: Temporal   Weight: 71.7 kg (158 lb)   Height: 1.72 m (5' 7.72\")       Body mass index is 24.23 kg/m .    Physical Exam:  General: patient is alert and oriented x 3, in no apparent distress  HEENT, Thyroid, Lymphatic, Cardiac, Pulmonary, GI, Musculoskeletal, Skin, and Neuro exams were completed today and grossly normal  : Deferred    Recent Results (from the past 24 hour(s))   Hemoglobin A1c    Collection Time: 07/15/22  9:23 AM   Result Value Ref Range    Hemoglobin A1C 5.7 (H) 0.0 - 5.6 %     Other labs pending.      ASSESSMENT and PLAN    1. Annual physical exam  Health maintenance discussed with patient as appropriate for age and risk factors.  Discussed considering shingles vaccine in the future, either here or at pharmacy.  He does not want to do that today.  He declines PSA.  He declines colon cancer screening.  History of smoking for 44 years off and on, but only smoking 1/2 to 1 cigarette a day, so does not meet criteria for low-dose CT scan for lung cancer screening.  Discussed quitting smoking completely.  Patient declines fourth COVID vaccine today.  I will follow-up with screening labs.  - Hemoglobin A1c  - Lipid Profile    2. Pre-diabetes  New diagnosis.  Patient will be informed.  Continue to encourage healthy eating and exercise.  This should be checked annually.      This dictation uses " voice recognition software, which may contain typographical errors.

## 2022-07-16 PROBLEM — F17.210 CIGARETTE NICOTINE DEPENDENCE WITHOUT COMPLICATION: Status: ACTIVE | Noted: 2022-07-16

## 2022-07-19 ENCOUNTER — TELEPHONE (OUTPATIENT)
Dept: FAMILY MEDICINE | Facility: CLINIC | Age: 57
End: 2022-07-19

## 2022-07-19 NOTE — TELEPHONE ENCOUNTER
----- Message from Radha Larose PA-C sent at 7/16/2022  6:49 PM CDT -----  His blood sugars are higher than average.  He has pre-diabetes, which means he has a higher risk of developing Diabetes in the next few years.  He is also at a higher risk of having a heart attack or stroke.  He should quit smoking completely, and work on increasing exercise and eating healthier (less rice/noodles/bread, more vegetables).  He should have his blood sugars and cholesterol rechecked in 1 year.

## 2022-07-19 NOTE — LETTER
July 25, 2022      Kignsley Raphael  [address]  [place] [address]        Dear ,    We are writing to inform you of your test results.    blood sugars are higher than average.  You pre-diabetes, which means you have a higher risk of developing Diabetes in the next few years.  You also are at a higher risk of having a heart attack or stroke.  You should quit smoking completely, and work on increasing exercise and eating healthier (less rice/noodles/bread, more vegetables).  You should have your blood sugars and cholesterol rechecked in 1 year    Resulted Orders   Hemoglobin A1c   Result Value Ref Range    Hemoglobin A1C 5.7 (H) 0.0 - 5.6 %      Comment:      Normal <5.7%   Prediabetes 5.7-6.4%    Diabetes 6.5% or higher     Note: Adopted from ADA consensus guidelines.   Lipid Profile   Result Value Ref Range    Cholesterol 139 <200 mg/dL    Triglycerides 50 <150 mg/dL    Direct Measure HDL 43 >=40 mg/dL    LDL Cholesterol Calculated 86 <=100 mg/dL    Non HDL Cholesterol 96 <130 mg/dL    Narrative    Cholesterol  Desirable:  <200 mg/dL    Triglycerides  Normal:  Less than 150 mg/dL  Borderline High:  150-199 mg/dL  High:  200-499 mg/dL  Very High:  Greater than or equal to 500 mg/dL    Direct Measure HDL  Female:  Greater than or equal to 50 mg/dL   Male:  Greater than or equal to 40 mg/dL    LDL Cholesterol  Desirable:  <100mg/dL  Above Desirable:  100-129 mg/dL   Borderline High:  130-159 mg/dL   High:  160-189 mg/dL   Very High:  >= 190 mg/dL    Non HDL Cholesterol  Desirable:  130 mg/dL  Above Desirable:  130-159 mg/dL  Borderline High:  160-189 mg/dL  High:  190-219 mg/dL  Very High:  Greater than or equal to 220 mg/dL       If you have any questions or concerns, please call the clinic at the number listed above.       Sincerely,

## 2024-07-16 ENCOUNTER — OFFICE VISIT (OUTPATIENT)
Dept: FAMILY MEDICINE | Facility: CLINIC | Age: 59
End: 2024-07-16
Payer: COMMERCIAL

## 2024-07-16 VITALS
WEIGHT: 170 LBS | HEIGHT: 68 IN | HEART RATE: 62 BPM | TEMPERATURE: 97.5 F | RESPIRATION RATE: 19 BRPM | OXYGEN SATURATION: 98 % | DIASTOLIC BLOOD PRESSURE: 84 MMHG | BODY MASS INDEX: 25.76 KG/M2 | SYSTOLIC BLOOD PRESSURE: 120 MMHG

## 2024-07-16 DIAGNOSIS — D17.30 LIPOMA OF SKIN AND SUBCUTANEOUS TISSUE: Primary | ICD-10-CM

## 2024-07-16 PROCEDURE — 99213 OFFICE O/P EST LOW 20 MIN: CPT | Performed by: FAMILY MEDICINE

## 2024-07-16 NOTE — PATIENT INSTRUCTIONS
Patient Education   Preventive Care Advice   This is general advice given by our system to help you stay healthy. However, your care team may have specific advice just for you. Please talk to your care team about your preventive care needs.  Nutrition  Eat 5 or more servings of fruits and vegetables each day.  Try wheat bread, brown rice and whole grain pasta (instead of white bread, rice, and pasta).  Get enough calcium and vitamin D. Check the label on foods and aim for 100% of the RDA (recommended daily allowance).  Lifestyle  Exercise at least 150 minutes each week  (30 minutes a day, 5 days a week).  Do muscle strengthening activities 2 days a week. These help control your weight and prevent disease.  No smoking.  Wear sunscreen to prevent skin cancer.  Have a dental exam and cleaning every 6 months.  Yearly exams  See your health care team every year to talk about:  Any changes in your health.  Any medicines your care team has prescribed.  Preventive care, family planning, and ways to prevent chronic diseases.  Shots (vaccines)   HPV shots (up to age 26), if you've never had them before.  Hepatitis B shots (up to age 59), if you've never had them before.  COVID-19 shot: Get this shot when it's due.  Flu shot: Get a flu shot every year.  Tetanus shot: Get a tetanus shot every 10 years.  Pneumococcal, hepatitis A, and RSV shots: Ask your care team if you need these based on your risk.  Shingles shot (for age 50 and up)  General health tests  Diabetes screening:  Starting at age 35, Get screened for diabetes at least every 3 years.  If you are younger than age 35, ask your care team if you should be screened for diabetes.  Cholesterol test: At age 39, start having a cholesterol test every 5 years, or more often if advised.  Bone density scan (DEXA): At age 50, ask your care team if you should have this scan for osteoporosis (brittle bones).  Hepatitis C: Get tested at least once in your life.  STIs (sexually  transmitted infections)  Before age 24: Ask your care team if you should be screened for STIs.  After age 24: Get screened for STIs if you're at risk. You are at risk for STIs (including HIV) if:  You are sexually active with more than one person.  You don't use condoms every time.  You or a partner was diagnosed with a sexually transmitted infection.  If you are at risk for HIV, ask about PrEP medicine to prevent HIV.  Get tested for HIV at least once in your life, whether you are at risk for HIV or not.  Cancer screening tests  Cervical cancer screening: If you have a cervix, begin getting regular cervical cancer screening tests starting at age 21.  Breast cancer scan (mammogram): If you've ever had breasts, begin having regular mammograms starting at age 40. This is a scan to check for breast cancer.  Colon cancer screening: It is important to start screening for colon cancer at age 45.  Have a colonoscopy test every 10 years (or more often if you're at risk) Or, ask your provider about stool tests like a FIT test every year or Cologuard test every 3 years.  To learn more about your testing options, visit:   .  For help making a decision, visit:   https://bit.ly/gd82492.  Prostate cancer screening test: If you have a prostate, ask your care team if a prostate cancer screening test (PSA) at age 55 is right for you.  Lung cancer screening: If you are a current or former smoker ages 50 to 80, ask your care team if ongoing lung cancer screenings are right for you.  For informational purposes only. Not to replace the advice of your health care provider. Copyright   2023 [facility] [employer]. All rights reserved. Clinically reviewed by the [facility] Transitions Program. [employer] 640678 - REV 01/24.     Patient Education   Preventive Care Advice   This is general advice given by our system to help you stay healthy. However, your care team may have specific advice just for you. Please talk to your care team  about your preventive care needs.  Nutrition  Eat 5 or more servings of fruits and vegetables each day.  Try wheat bread, brown rice and whole grain pasta (instead of white bread, rice, and pasta).  Get enough calcium and vitamin D. Check the label on foods and aim for 100% of the RDA (recommended daily allowance).  Lifestyle  Exercise at least 150 minutes each week  (30 minutes a day, 5 days a week).  Do muscle strengthening activities 2 days a week. These help control your weight and prevent disease.  No smoking.  Wear sunscreen to prevent skin cancer.  Have a dental exam and cleaning every 6 months.  Yearly exams  See your health care team every year to talk about:  Any changes in your health.  Any medicines your care team has prescribed.  Preventive care, family planning, and ways to prevent chronic diseases.  Shots (vaccines)   HPV shots (up to age 26), if you've never had them before.  Hepatitis B shots (up to age 59), if you've never had them before.  COVID-19 shot: Get this shot when it's due.  Flu shot: Get a flu shot every year.  Tetanus shot: Get a tetanus shot every 10 years.  Pneumococcal, hepatitis A, and RSV shots: Ask your care team if you need these based on your risk.  Shingles shot (for age 50 and up)  General health tests  Diabetes screening:  Starting at age 35, Get screened for diabetes at least every 3 years.  If you are younger than age 35, ask your care team if you should be screened for diabetes.  Cholesterol test: At age 39, start having a cholesterol test every 5 years, or more often if advised.  Bone density scan (DEXA): At age 50, ask your care team if you should have this scan for osteoporosis (brittle bones).  Hepatitis C: Get tested at least once in your life.  STIs (sexually transmitted infections)  Before age 24: Ask your care team if you should be screened for STIs.  After age 24: Get screened for STIs if you're at risk. You are at risk for STIs (including HIV) if:  You are  sexually active with more than one person.  You don't use condoms every time.  You or a partner was diagnosed with a sexually transmitted infection.  If you are at risk for HIV, ask about PrEP medicine to prevent HIV.  Get tested for HIV at least once in your life, whether you are at risk for HIV or not.  Cancer screening tests  Cervical cancer screening: If you have a cervix, begin getting regular cervical cancer screening tests starting at age 21.  Breast cancer scan (mammogram): If you've ever had breasts, begin having regular mammograms starting at age 40. This is a scan to check for breast cancer.  Colon cancer screening: It is important to start screening for colon cancer at age 45.  Have a colonoscopy test every 10 years (or more often if you're at risk) Or, ask your provider about stool tests like a FIT test every year or Cologuard test every 3 years.  To learn more about your testing options, visit:   .  For help making a decision, visit:   https://bit.ly/mk89864.  Prostate cancer screening test: If you have a prostate, ask your care team if a prostate cancer screening test (PSA) at age 55 is right for you.  Lung cancer screening: If you are a current or former smoker ages 50 to 80, ask your care team if ongoing lung cancer screenings are right for you.  For informational purposes only. Not to replace the advice of your health care provider. Copyright   2023 [employer]. All rights reserved. Clinically reviewed by the [facility] Transitions Program. [employer] 615376 - REV 01/24.

## 2024-07-16 NOTE — PROGRESS NOTES
"  Assessment & Plan     (D17.30) Lipoma of skin and subcutaneous tissue  (primary encounter diagnosis)  Comment: left popliteal fossa, neck  Plan: Adult Gen Surg  Referral        EHR reviewed.   Past medical history, problem list, past surgical history, family history, social history, medications reviewed, updated, reconciled.   The mass on the left popliteal fossa is quite large and appears a as lipoma though not clear. Was referred to ultrasound when he saw orthopedics in May but no imaging completed. Either way, recommended consultation and removal by surgery for this and the lesion on the neck. Referral placed. Will follow recommendations.   Defers all vaccines today. Strongly urged to return for annual physical.             BMI  Estimated body mass index is 26.03 kg/m  as calculated from the following:    Height as of this encounter: 1.721 m (5' 7.76\").    Weight as of this encounter: 77.1 kg (170 lb).           Vito Wynn is a 59 year old, presenting for the following health issues:  Establish Care and Derm Problem (Bump on neck and behind knee. /)      7/16/2024     8:51 AM   Additional Questions   Roomed by Briseida SANTANA   Accompanied by wife     History of Present Illness       Reason for visit:  I have a lump on my leg    He eats 0-1 servings of fruits and vegetables daily.He consumes 1 sweetened beverage(s) daily.He exercises with enough effort to increase his heart rate 9 or less minutes per day.  He exercises with enough effort to increase his heart rate 3 or less days per week.   He is taking medications regularly.           [age] old male with history of smoking, prediabetes here with his wife. Concerned about a bump behind his knee and neck.   This started several years ago. Patient thinks he was [place], it seemed to get better but then slowly started to develop a bump. It is is now big, bothersome. Also has a smaller bump on his neck. The bump is " "not painful. No drainage. Would like to have these removed. He was seen for his back a couple months ago. He was referred somewhere but could not go due to an emergency.    No past medical history on file.  Past Surgical History:   Procedure Laterality Date    NO PAST SURGERIES       No family history on file.  Social History     Socioeconomic History    Marital status:      Spouse name: Not on file    Number of children: Not on file    Years of education: Not on file    Highest education level: Not on file   Occupational History    Not on file   Tobacco Use    Smoking status: Former     Types: Cigarettes    Smokeless tobacco: Former   Substance and Sexual Activity    Alcohol use: Yes     Comment: Alcoholic Drinks/day: social    Drug use: No    Sexual activity: Yes     Partners: Female   Other Topics Concern    Not on file   Social History Narrative     - 4 children  Born in [facility] - came to  [place]       Social Determinants of Health     Financial Resource Strain: Not on file   Food Insecurity: Not on file   Transportation Needs: Not on file   Physical Activity: Not on file   Stress: Not on file   Social Connections: Not on file   Interpersonal Safety: Not on file   Housing Stability: Not on file     No current outpatient medications on file.     No current facility-administered medications for this visit.             Objective    /84 (BP Location: Right arm, Patient Position: Sitting, Cuff Size: Adult Regular)   Pulse 62   Temp 97.5  F (36.4  C) (Temporal)   Resp 19   Ht 1.721 m (5' 7.76\")   Wt 77.1 kg (170 lb)   SpO2 98%   BMI 26.03 kg/m    Body mass index is 26.03 kg/m .  Physical Exam   GENERAL: alert and no distress  EYES: Eyes grossly normal to inspection, PERRL and conjunctivae and sclerae normal  NECK: no adenopathy, no asymmetry, masses, or scars  SKIN: left popliteal fossa with large soft round mass, 4 x 4 cm, neck with 1 cm subcutaneous mass, soft, moveable, non tender      "     Signed Electronically by: Faviola Rasmussen MD      Prior to immunization administration, verified patients identity using patient s name and date of birth. Please see Immunization Activity for additional information.     Screening Questionnaire for Adult Immunization    Are you sick today?   No   Do you have allergies to medications, food, a vaccine component or latex?   No   Have you ever had a serious reaction after receiving a vaccination?   No   Do you have a long-term health problem with heart, lung, kidney, or metabolic disease (e.g., diabetes), asthma, a blood disorder, no spleen, complement component deficiency, a cochlear implant, or a spinal fluid leak?  Are you on long-term aspirin therapy?   No   Do you have cancer, leukemia, HIV/AIDS, or any other immune system problem?   No   Do you have a parent, brother, or sister with an immune system problem?   No   In the past 3 months, have you taken medications that affect  your immune system, such as prednisone, other steroids, or anticancer drugs; drugs for the treatment of rheumatoid arthritis, Crohn s disease, or psoriasis; or have you had radiation treatments?   No   Have you had a seizure, or a brain or other nervous system problem?   No   During the past year, have you received a transfusion of blood or blood    products, or been given immune (gamma) globulin or antiviral drug?   No   For women: Are you pregnant or is there a chance you could become       pregnant during the next month?   No   Have you received any vaccinations in the past 4 weeks?   No     Immunization questionnaire answers were all negative.      Patient instructed to remain in clinic for 15 minutes afterwards, and to report any adverse reactions.     Screening performed by Briseida Restrepo MA on 7/16/2024 at 8:55 AM.

## 2024-07-23 NOTE — PROGRESS NOTES
This is a consultation from No Ref-Primary, Physician to address a subcutaneous mass.    HPI: Pt is here with concerns about a subcutaneous mass located on the back of his neck and on the back of his Left Knee.  It has been present for 10 years.   This lesion is not tender.  The lesion has not drained.    Allergies:Patient has no known allergies.    No past medical history on file.    Past Surgical History:   Procedure Laterality Date    NO PAST SURGERIES         REVIEW OF SYSTEMS:  10 point ROS is negative except for; as mentioned above.    CURRENT MEDS:  No current outpatient medications on file.    There were no vitals taken for this visit.  There is no height or weight on file to calculate BMI.    EXAM:  GENERAL:Well developed he appears his stated age  HEAD & NECK: Extraocular motions intact, anicteric sclera,  ABDOMEN: Soft and nondistended, positive bowel sounds  LUNGS:  CTA  HEART:  RRR  EXTREMITIES: Full mobility,   INTEGUMENT: The patient has a small 2 cm lesion on the back of the neck and a 6 cm lesion on the back of the Left Knee.  These lesions are in the subcutanous tissues and they are mobile.  .    Assessment/Plan: The pt has 2 masses, a  2 cm  subcutaneous lesion located on the back of the neck and a 6 cm mass on the back of the knee..    These lesions are likely either a Lipoma or a Sebaccous Cyst. These lesion is growing in size and/or is painful at times.  With these features I recommend removal of this lesion.     He would like to have these lesions removed. I discussed this with he. I discussed the risk of bleeding and infection with the patient. We will get this scheduled through our clinic.    Rosendo Doss MD ,MD  [phone]  [facility] Department of Surgery

## 2024-07-24 ENCOUNTER — OFFICE VISIT (OUTPATIENT)
Dept: SURGERY | Facility: CLINIC | Age: 59
End: 2024-07-24
Attending: FAMILY MEDICINE
Payer: COMMERCIAL

## 2024-07-24 VITALS — BODY MASS INDEX: 25.87 KG/M2 | SYSTOLIC BLOOD PRESSURE: 134 MMHG | DIASTOLIC BLOOD PRESSURE: 78 MMHG | WEIGHT: 168.9 LBS

## 2024-07-24 DIAGNOSIS — D17.30 LIPOMA OF SKIN AND SUBCUTANEOUS TISSUE: ICD-10-CM

## 2024-07-24 PROCEDURE — 99204 OFFICE O/P NEW MOD 45 MIN: CPT | Performed by: SURGERY

## 2024-07-24 RX ORDER — ACETAMINOPHEN 325 MG/1
975 TABLET ORAL ONCE
Status: CANCELLED | OUTPATIENT
Start: 2024-07-24 | End: 2024-07-24

## 2024-07-24 NOTE — LETTER
7/24/2024      Kingsley Raphael  [address]  [place] [address]      Dear Colleague,    Thank you for referring your patient, Kingsley Raphael, to the [facility] [place]. Please see a copy of my visit note below.    This is a consultation from No Ref-Primary, Physician to address a subcutaneous mass.    HPI: Pt is here with concerns about a subcutaneous mass located on the back of his neck and on the back of his Left Knee.  It has been present for 10 years.   This lesion is not tender.  The lesion has not drained.    Allergies:Patient has no known allergies.    No past medical history on file.    Past Surgical History:   Procedure Laterality Date     NO PAST SURGERIES         REVIEW OF SYSTEMS:  10 point ROS is negative except for; as mentioned above.    CURRENT MEDS:  No current outpatient medications on file.    There were no vitals taken for this visit.  There is no height or weight on file to calculate BMI.    EXAM:  GENERAL:Well developed he appears his stated age  HEAD & NECK: Extraocular motions intact, anicteric sclera,  ABDOMEN: Soft and nondistended, positive bowel sounds  LUNGS:  CTA  HEART:  RRR  EXTREMITIES: Full mobility,   INTEGUMENT: The patient has a small 2 cm lesion on the back of the neck and a 6 cm lesion on the back of the Left Knee.  These lesions are in the subcutanous tissues and they are mobile.  .    Assessment/Plan: The pt has 2 masses, a  2 cm  subcutaneous lesion located on the back of the neck and a 6 cm mass on the back of the knee..    These lesions are likely either a Lipoma or a Sebaccous Cyst. These lesion is growing in size and/or is painful at times.  With these features I recommend removal of this lesion.     He would like to have these lesions removed. I discussed this with he. I discussed the risk of bleeding and infection with the patient. We will get this scheduled through our clinic.    Rosendo Doss MD ,MD  [phone]  [facility]  Department of Surgery       Again, thank you for allowing me to participate in the care of your patient.        Sincerely,        Rosendo Doss MD

## 2024-07-25 PROBLEM — D17.30 LIPOMA OF SKIN AND SUBCUTANEOUS TISSUE: Status: ACTIVE | Noted: 2024-07-24

## 2024-08-21 ENCOUNTER — OFFICE VISIT (OUTPATIENT)
Dept: FAMILY MEDICINE | Facility: CLINIC | Age: 59
End: 2024-08-21
Payer: COMMERCIAL

## 2024-08-21 VITALS
WEIGHT: 169 LBS | TEMPERATURE: 97.9 F | RESPIRATION RATE: 16 BRPM | HEART RATE: 65 BPM | HEIGHT: 67 IN | BODY MASS INDEX: 26.53 KG/M2 | SYSTOLIC BLOOD PRESSURE: 135 MMHG | DIASTOLIC BLOOD PRESSURE: 84 MMHG | OXYGEN SATURATION: 98 %

## 2024-08-21 DIAGNOSIS — Z01.818 PRE-OP EXAM: Primary | ICD-10-CM

## 2024-08-21 DIAGNOSIS — D17.30 LIPOMA OF SKIN AND SUBCUTANEOUS TISSUE: ICD-10-CM

## 2024-08-21 LAB
ANION GAP SERPL CALCULATED.3IONS-SCNC: 9 MMOL/L (ref 7–15)
BUN SERPL-MCNC: 18 MG/DL (ref 6–20)
CALCIUM SERPL-MCNC: 8.7 MG/DL (ref 8.8–10.4)
CHLORIDE SERPL-SCNC: 108 MMOL/L (ref 98–107)
CREAT SERPL-MCNC: 1.04 MG/DL (ref 0.67–1.17)
EGFRCR SERPLBLD CKD-EPI 2021: 84 ML/MIN/1.73M2
ERYTHROCYTE [DISTWIDTH] IN BLOOD BY AUTOMATED COUNT: 13 % (ref 10–15)
GLUCOSE SERPL-MCNC: 86 MG/DL (ref 70–99)
HCO3 SERPL-SCNC: 24 MMOL/L (ref 22–29)
HCT VFR BLD AUTO: 44.5 % (ref 40–53)
HGB BLD-MCNC: 13.9 G/DL (ref 13.3–17.7)
MCH RBC QN AUTO: 26.6 PG (ref 26.5–33)
MCHC RBC AUTO-ENTMCNC: 31.2 G/DL (ref 31.5–36.5)
MCV RBC AUTO: 85 FL (ref 78–100)
PLATELET # BLD AUTO: 265 10E3/UL (ref 150–450)
POTASSIUM SERPL-SCNC: 3.8 MMOL/L (ref 3.4–5.3)
RBC # BLD AUTO: 5.22 10E6/UL (ref 4.4–5.9)
SODIUM SERPL-SCNC: 141 MMOL/L (ref 135–145)
WBC # BLD AUTO: 5.7 10E3/UL (ref 4–11)

## 2024-08-21 PROCEDURE — 85027 COMPLETE CBC AUTOMATED: CPT | Performed by: FAMILY MEDICINE

## 2024-08-21 PROCEDURE — 36415 COLL VENOUS BLD VENIPUNCTURE: CPT | Performed by: FAMILY MEDICINE

## 2024-08-21 PROCEDURE — 99214 OFFICE O/P EST MOD 30 MIN: CPT | Performed by: FAMILY MEDICINE

## 2024-08-21 PROCEDURE — 80048 BASIC METABOLIC PNL TOTAL CA: CPT | Performed by: FAMILY MEDICINE

## 2024-08-21 NOTE — PROGRESS NOTES
Preoperative Evaluation  [facility] [address] [place] [address]  Phone: [phone]  Fax: [phone]  Primary Provider: Physician No Ref-Primary  Pre-op Performing Provider: Faviola Rasmussen MD  Aug 21, 2024             8/21/2024   Surgical Information   What procedure is being done? physical check up   Facility or Hospital where procedure/surgery will be performed: [facility]   Who is doing the procedure / surgery? surgery   Date of surgery / procedure: [id number]   Time of surgery / procedure: 11 a m   Where do you plan to recover after surgery? at home with family        Fax number for surgical facility: Note does not need to be faxed, will be available electronically in Epic.    Assessment & Plan     The proposed surgical procedure is considered LOW risk.    (Z01.818) Pre-op exam  (primary encounter diagnosis)  Plan: Basic metabolic panel  (Ca, Cl, CO2, Creat,         Gluc, K, Na, BUN), CBC with platelets  (D17.30) Lipoma of skin and subcutaneous tissue     EHR reviewed.   Past medical history, problem list, past surgical history, family history, social history, medications reviewed, updated, reconciled.   Cleared for surgery with appropriate anesthesia.   Encouraged to keep up with routine health maintenance and return for immunization update.        - No identified additional risk factors other than previously addressed         Recommendation  Approval given to proceed with proposed procedure, without further diagnostic evaluation.    Vito Wynn is a 59 year old, presenting for the following:  Pre-Op Exam          8/21/2024    10:24 AM   Additional Questions   Roomed by Briseida SANTANA   Accompanied by self     HPI related to upcoming procedure: [age] year old male with history of prediabetes her for preop evaluation.   He has two masses one on the back of the neck and one on the back of the knee.   They were present for many years, getting to be big and  bothersome. He is scheduled for removal.   He notes history of teeth surgery in the past no complications.   He feels well.   He denies any recent illness or hospitalizations.             8/21/2024   Pre-Op Questionnaire   Have you ever had a heart attack or stroke? No   Have you ever had surgery on your heart or blood vessels, such as a stent placement, a coronary artery bypass, or surgery on an artery in your head, neck, heart, or legs? No   Do you have chest pain with activity? No   Do you have a history of heart failure? No   Do you currently have a cold, bronchitis or symptoms of other infection? No   Do you have a cough, shortness of breath, or wheezing? No   Do you or anyone in your family have previous history of blood clots? No   Do you or does anyone in your family have a serious bleeding problem such as prolonged bleeding following surgeries or cuts? No   Have you ever had problems with anemia or been told to take iron pills? No   Have you had any abnormal blood loss such as black, tarry or bloody stools? No   Have you ever had a blood transfusion? No   Are you willing to have a blood transfusion if it is medically needed before, during, or after your surgery? (!) NO    Have you or any of your relatives ever had problems with anesthesia? No   Do you have sleep apnea, excessive snoring or daytime drowsiness? No   Do you have any artifical heart valves or other implanted medical devices like a pacemaker, defibrillator, or continuous glucose monitor? No   Do you have artificial joints? No   Are you allergic to latex? No        Health Care Directive  Patient does not have a Health Care Directive or Living Will: Discussed advance care planning with patient; however, patient declined at this time.    Preoperative Review of    reviewed - no record of controlled substances prescribed.      Status of Chronic Conditions:  none    Patient Active Problem List    Diagnosis Date Noted    Lipoma of skin and  "subcutaneous tissue 07/24/2024     Priority: Medium    Cigarette nicotine dependence without complication 07/16/2022     Priority: Medium    Pre-diabetes 07/15/2022     Priority: Medium      Past Medical History:   Diagnosis Date    Calculus of kidney     Prediabetes      Past Surgical History:   Procedure Laterality Date    NO PAST SURGERIES      wisdom teeth      1990, [place]     No current outpatient medications on file.       No Known Allergies     Social History     Tobacco Use    Smoking status: Former     Types: Cigarettes    Smokeless tobacco: Former   Substance Use Topics    Alcohol use: Yes     Comment: Alcoholic Drinks/day: social     Family History   Problem Relation Age of Onset    Cerebrovascular Disease Sister      History   Drug Use No             Review of Systems  Constitutional, HEENT, cardiovascular, pulmonary, GI, , musculoskeletal, neuro, skin, endocrine and psych systems are negative, except as otherwise noted.    Objective    /84 (BP Location: Right arm, Patient Position: Sitting, Cuff Size: Adult Regular)   Pulse 65   Temp 97.9  F (36.6  C) (Oral)   Resp 16   Ht 1.714 m (5' 7.48\")   Wt 76.7 kg (169 lb)   SpO2 98%   BMI 26.09 kg/m     Estimated body mass index is 26.09 kg/m  as calculated from the following:    Height as of this encounter: 1.714 m (5' 7.48\").    Weight as of this encounter: 76.7 kg (169 lb).  Physical Exam  GENERAL: alert and no distress  EYES: Eyes grossly normal to inspection, PERRL and conjunctivae and sclerae normal  NECK: no adenopathy, no asymmetry, masses, or scars  RESP: lungs clear to auscultation - no rales, rhonchi or wheezes  CV: regular rate and rhythm, normal S1 S2, no S3 or S4, no murmur, click or rub, no peripheral edema  MS: no gross musculoskeletal defects noted, no edema  NEURO: Normal strength and tone, mentation intact and speech normal  PSYCH: mentation appears normal, affect normal/bright    No results for input(s): \"HGB\", \"PLT\", \"INR\", \"NA\", " "\"POTASSIUM\", \"CR\", \"A1C\" in the last 8760 hours.     Diagnostics  Recent Results (from the past 24 hour(s))   CBC with platelets    Collection Time: 08/21/24 10:52 AM   Result Value Ref Range    WBC Count 5.7 4.0 - 11.0 10e3/uL    RBC Count 5.22 4.40 - 5.90 10e6/uL    Hemoglobin 13.9 13.3 - 17.7 g/dL    Hematocrit 44.5 40.0 - 53.0 %    MCV 85 78 - 100 fL    MCH 26.6 26.5 - 33.0 pg    MCHC 31.2 (L) 31.5 - 36.5 g/dL    RDW 13.0 10.0 - 15.0 %    Platelet Count 265 150 - 450 10e3/uL      No EKG required for low risk surgery (cataract, skin procedure, breast biopsy, etc).    Revised Cardiac Risk Index (RCRI)  The patient has the following serious cardiovascular risks for perioperative complications:   - No serious cardiac risks = 0 points     RCRI Interpretation: 0 points: Class I (very low risk - 0.4% complication rate)         Signed Electronically by: Faviola Rasmussen MD  A copy of this evaluation report is provided to the requesting physician.         "

## 2024-08-28 ENCOUNTER — ANESTHESIA EVENT (OUTPATIENT)
Dept: SURGERY | Facility: AMBULATORY SURGERY CENTER | Age: 59
End: 2024-08-28
Payer: COMMERCIAL

## 2024-08-29 ENCOUNTER — HOSPITAL ENCOUNTER (OUTPATIENT)
Facility: AMBULATORY SURGERY CENTER | Age: 59
Discharge: HOME | End: 2024-08-29
Attending: SURGERY
Payer: COMMERCIAL

## 2024-08-29 ENCOUNTER — ANESTHESIA (OUTPATIENT)
Dept: SURGERY | Facility: AMBULATORY SURGERY CENTER | Age: 59
End: 2024-08-29
Payer: COMMERCIAL

## 2024-08-29 VITALS
SYSTOLIC BLOOD PRESSURE: 123 MMHG | HEIGHT: 67 IN | DIASTOLIC BLOOD PRESSURE: 78 MMHG | OXYGEN SATURATION: 96 % | RESPIRATION RATE: 16 BRPM | TEMPERATURE: 96.9 F | HEART RATE: 80 BPM | BODY MASS INDEX: 26.68 KG/M2 | WEIGHT: 170 LBS

## 2024-08-29 DIAGNOSIS — D17.30 LIPOMA OF SKIN AND SUBCUTANEOUS TISSUE: ICD-10-CM

## 2024-08-29 PROCEDURE — 11404 EXC TR-EXT B9+MARG 3.1-4 CM: CPT | Mod: 51 | Performed by: SURGERY

## 2024-08-29 PROCEDURE — 11422 EXC H-F-NK-SP B9+MARG 1.1-2: CPT | Performed by: SURGERY

## 2024-08-29 RX ORDER — GLYCOPYRROLATE 0.2 MG/ML
INJECTION, SOLUTION INTRAMUSCULAR; INTRAVENOUS PRN
Status: DISCONTINUED | OUTPATIENT
Start: 2024-08-29 | End: 2024-08-29

## 2024-08-29 RX ORDER — FENTANYL CITRATE 0.05 MG/ML
25 INJECTION, SOLUTION INTRAMUSCULAR; INTRAVENOUS
Status: DISCONTINUED | OUTPATIENT
Start: 2024-08-29 | End: 2024-08-31 | Stop reason: HOSPADM

## 2024-08-29 RX ORDER — ACETAMINOPHEN 325 MG/1
975 TABLET ORAL ONCE
Status: COMPLETED | OUTPATIENT
Start: 2024-08-29 | End: 2024-08-29

## 2024-08-29 RX ORDER — PROPOFOL 10 MG/ML
INJECTION, EMULSION INTRAVENOUS CONTINUOUS PRN
Status: DISCONTINUED | OUTPATIENT
Start: 2024-08-29 | End: 2024-08-29

## 2024-08-29 RX ORDER — SODIUM CHLORIDE, SODIUM LACTATE, POTASSIUM CHLORIDE, CALCIUM CHLORIDE 600; 310; 30; 20 MG/100ML; MG/100ML; MG/100ML; MG/100ML
INJECTION, SOLUTION INTRAVENOUS CONTINUOUS
Status: DISCONTINUED | OUTPATIENT
Start: 2024-08-29 | End: 2024-08-31 | Stop reason: HOSPADM

## 2024-08-29 RX ORDER — LABETALOL 20 MG/4 ML (5 MG/ML) INTRAVENOUS SYRINGE
PRN
Status: DISCONTINUED | OUTPATIENT
Start: 2024-08-29 | End: 2024-08-29

## 2024-08-29 RX ORDER — ACETAMINOPHEN 325 MG/1
650 TABLET ORAL EVERY 4 HOURS PRN
Qty: 50 TABLET | Refills: 0 | Status: SHIPPED | OUTPATIENT
Start: 2024-08-29

## 2024-08-29 RX ORDER — IBUPROFEN 600 MG/1
600 TABLET, FILM COATED ORAL EVERY 6 HOURS PRN
Qty: 30 TABLET | Refills: 0 | Status: SHIPPED | OUTPATIENT
Start: 2024-08-29

## 2024-08-29 RX ORDER — ONDANSETRON 2 MG/ML
4 INJECTION INTRAMUSCULAR; INTRAVENOUS EVERY 30 MIN PRN
Status: DISCONTINUED | OUTPATIENT
Start: 2024-08-29 | End: 2024-08-31 | Stop reason: HOSPADM

## 2024-08-29 RX ORDER — FENTANYL CITRATE 50 UG/ML
INJECTION, SOLUTION INTRAMUSCULAR; INTRAVENOUS PRN
Status: DISCONTINUED | OUTPATIENT
Start: 2024-08-29 | End: 2024-08-29

## 2024-08-29 RX ORDER — ONDANSETRON 2 MG/ML
INJECTION INTRAMUSCULAR; INTRAVENOUS PRN
Status: DISCONTINUED | OUTPATIENT
Start: 2024-08-29 | End: 2024-08-29

## 2024-08-29 RX ORDER — ONDANSETRON 4 MG/1
4 TABLET, ORALLY DISINTEGRATING ORAL EVERY 30 MIN PRN
Status: DISCONTINUED | OUTPATIENT
Start: 2024-08-29 | End: 2024-08-31 | Stop reason: HOSPADM

## 2024-08-29 RX ORDER — CEFAZOLIN SODIUM 2 G/100ML
2 INJECTION, SOLUTION INTRAVENOUS SEE ADMIN INSTRUCTIONS
Status: DISCONTINUED | OUTPATIENT
Start: 2024-08-29 | End: 2024-08-31 | Stop reason: HOSPADM

## 2024-08-29 RX ORDER — ACETAMINOPHEN 325 MG/1
975 TABLET ORAL ONCE
Status: DISCONTINUED | OUTPATIENT
Start: 2024-08-29 | End: 2024-08-31 | Stop reason: HOSPADM

## 2024-08-29 RX ORDER — KETOROLAC TROMETHAMINE 30 MG/ML
INJECTION, SOLUTION INTRAMUSCULAR; INTRAVENOUS PRN
Status: DISCONTINUED | OUTPATIENT
Start: 2024-08-29 | End: 2024-08-29

## 2024-08-29 RX ORDER — CEFAZOLIN SODIUM 2 G/100ML
2 INJECTION, SOLUTION INTRAVENOUS
Status: COMPLETED | OUTPATIENT
Start: 2024-08-29 | End: 2024-08-29

## 2024-08-29 RX ORDER — LIDOCAINE 40 MG/G
CREAM TOPICAL
Status: DISCONTINUED | OUTPATIENT
Start: 2024-08-29 | End: 2024-08-31 | Stop reason: HOSPADM

## 2024-08-29 RX ORDER — OXYCODONE HYDROCHLORIDE 10 MG/1
10 TABLET ORAL
Status: DISCONTINUED | OUTPATIENT
Start: 2024-08-29 | End: 2024-08-31 | Stop reason: HOSPADM

## 2024-08-29 RX ORDER — DEXAMETHASONE SODIUM PHOSPHATE 4 MG/ML
4 INJECTION, SOLUTION INTRA-ARTICULAR; INTRALESIONAL; INTRAMUSCULAR; INTRAVENOUS; SOFT TISSUE
Status: DISCONTINUED | OUTPATIENT
Start: 2024-08-29 | End: 2024-08-31 | Stop reason: HOSPADM

## 2024-08-29 RX ORDER — NALOXONE HYDROCHLORIDE 0.4 MG/ML
0.1 INJECTION, SOLUTION INTRAMUSCULAR; INTRAVENOUS; SUBCUTANEOUS
Status: DISCONTINUED | OUTPATIENT
Start: 2024-08-29 | End: 2024-08-31 | Stop reason: HOSPADM

## 2024-08-29 RX ORDER — LIDOCAINE HYDROCHLORIDE 20 MG/ML
INJECTION, SOLUTION INFILTRATION; PERINEURAL PRN
Status: DISCONTINUED | OUTPATIENT
Start: 2024-08-29 | End: 2024-08-29

## 2024-08-29 RX ORDER — OXYCODONE HYDROCHLORIDE 5 MG/1
5 TABLET ORAL
Status: DISCONTINUED | OUTPATIENT
Start: 2024-08-29 | End: 2024-08-31 | Stop reason: HOSPADM

## 2024-08-29 RX ORDER — BUPIVACAINE HYDROCHLORIDE AND EPINEPHRINE 2.5; 5 MG/ML; UG/ML
INJECTION, SOLUTION INFILTRATION; PERINEURAL PRN
Status: DISCONTINUED | OUTPATIENT
Start: 2024-08-29 | End: 2024-08-29 | Stop reason: HOSPADM

## 2024-08-29 RX ADMIN — PROPOFOL 160 MCG/KG/MIN: 10 INJECTION, EMULSION INTRAVENOUS at 11:59

## 2024-08-29 RX ADMIN — LABETALOL 20 MG/4 ML (5 MG/ML) INTRAVENOUS SYRINGE 5 MG: at 12:45

## 2024-08-29 RX ADMIN — FENTANYL CITRATE 25 MCG: 50 INJECTION, SOLUTION INTRAMUSCULAR; INTRAVENOUS at 12:45

## 2024-08-29 RX ADMIN — FENTANYL CITRATE 25 MCG: 50 INJECTION, SOLUTION INTRAMUSCULAR; INTRAVENOUS at 12:06

## 2024-08-29 RX ADMIN — LIDOCAINE HYDROCHLORIDE 2.5 ML: 20 INJECTION, SOLUTION INFILTRATION; PERINEURAL at 11:59

## 2024-08-29 RX ADMIN — ONDANSETRON 4 MG: 2 INJECTION INTRAMUSCULAR; INTRAVENOUS at 12:04

## 2024-08-29 RX ADMIN — SODIUM CHLORIDE, SODIUM LACTATE, POTASSIUM CHLORIDE, CALCIUM CHLORIDE: 600; 310; 30; 20 INJECTION, SOLUTION INTRAVENOUS at 11:16

## 2024-08-29 RX ADMIN — CEFAZOLIN SODIUM 2 G: 2 INJECTION, SOLUTION INTRAVENOUS at 11:56

## 2024-08-29 RX ADMIN — ACETAMINOPHEN 975 MG: 325 TABLET ORAL at 10:57

## 2024-08-29 RX ADMIN — GLYCOPYRROLATE 0.2 MG: 0.2 INJECTION, SOLUTION INTRAMUSCULAR; INTRAVENOUS at 12:04

## 2024-08-29 RX ADMIN — FENTANYL CITRATE 25 MCG: 50 INJECTION, SOLUTION INTRAMUSCULAR; INTRAVENOUS at 12:15

## 2024-08-29 RX ADMIN — KETOROLAC TROMETHAMINE 15 MG: 30 INJECTION, SOLUTION INTRAMUSCULAR; INTRAVENOUS at 12:45

## 2024-08-29 RX ADMIN — FENTANYL CITRATE 25 MCG: 50 INJECTION, SOLUTION INTRAMUSCULAR; INTRAVENOUS at 12:38

## 2024-08-29 RX ADMIN — SODIUM CHLORIDE, SODIUM LACTATE, POTASSIUM CHLORIDE, CALCIUM CHLORIDE: 600; 310; 30; 20 INJECTION, SOLUTION INTRAVENOUS at 12:31

## 2024-08-29 ASSESSMENT — LIFESTYLE VARIABLES: TOBACCO_USE: 1

## 2024-08-29 NOTE — H&P
"HISTORY AND PHYSICAL UPDATE:    I have assessed the patient and evaluated the chart and and verify the patient's clinical status has not changed since our last documentation.  The patient is ready to move forward with the planned surgery.  Lungs: Clear to auscultation  Heart: Regular rate and rhythm    HPI: Pt is here with concerns about a subcutaneous mass located on the back of his neck and on the back of his Left Knee.  It has been present for 10 years.   This lesion is not tender.  The lesion has not drained.     Allergies:Patient has no known allergies.     Past Medical History   No past medical history on file.        Past Surgical History         Past Surgical History:   Procedure Laterality Date    NO PAST SURGERIES                REVIEW OF SYSTEMS:  10 point ROS is negative except for; as mentioned above.     CURRENT MEDS:  Current Medications   No current outpatient medications on file.     BP (!) 159/97 (BP Location: Right arm, Cuff Size: Adult Regular)   Pulse 81   Temp 97.5  F (36.4  C) (Temporal)   Resp 15   Ht 1.714 m (5' 7.48\")   Wt 77.1 kg (170 lb)   SpO2 97%   BMI 26.25 kg/m        EXAM:  GENERAL:Well developed he appears his stated age  HEAD & NECK: Extraocular motions intact, anicteric sclera,  ABDOMEN: Soft and nondistended, positive bowel sounds  LUNGS:  CTA  HEART:  RRR  EXTREMITIES: Full mobility,   INTEGUMENT: The patient has a small 2 cm lesion on the back of the neck and a 6 cm lesion on the back of the Left Knee.  These lesions are in the subcutanous tissues and they are mobile.  .     Assessment/Plan: The pt has 2 masses, a  2 cm  subcutaneous lesion located on the back of the neck and a 6 cm mass on the back of the knee..    These lesions are likely either a Lipoma or a Sebaccous Cyst. These lesion is growing in size and/or is painful at times.  With these features I recommend removal of this lesion.       Rosendo Doss  [facility]; surgeons  [phone]    "

## 2024-08-29 NOTE — DISCHARGE INSTRUCTIONS
You have received 975 mg of Acetaminophen (Tylenol) at 10:57. Please do not take an additional dose of Tylenol until after 4:57 pm     Do not exceed 4,000 mg of acetaminophen during a 24 hour period and keep in mind that acetaminophen can also be found in many over-the-counter cold medications as well as narcotics that may be given for pain.     You received a medication called Toradol (a stronger IV ibuprofen) at 12:45 pm. Do NOT take any Ibuprofen / Advil / Aleve / Naproxen or products containing Ibuprofen until 6:45 pm or later.     If you have any questions or concerns regarding your procedure, please contact Dr. Doss, his office number is [phone].     Adult Discharge Orders & Instructions     For 24 hours after surgery    Get plenty of rest.  A responsible adult must stay with you for at least 24 hours after you leave the hospital.   Do not drive or use heavy equipment.  If you have weakness or tingling, don't drive or use heavy equipment until this feeling goes away.  Do not drink alcohol.  Avoid strenuous or risky activities.  Ask for help when climbing stairs.   You may feel lightheaded.  IF so, sit for a few minutes before standing.  Have someone help you get up.   If you have nausea (feel sick to your stomach): Drink only clear liquids such as apple juice, ginger ale, broth or 7-Up.  Rest may also help.  Be sure to drink enough fluids.  Move to a regular diet as you feel able.  You may have a slight fever. Call the doctor if your fever is over 100 F (37.7 C) (taken under the tongue) or lasts longer than 24 hours.  You may have a dry mouth, a sore throat, muscle aches or trouble sleeping.  These should go away after 24 hours.  Do not make important or legal decisions.     Call your doctor for any of the followin.  Signs of infection (fever, growing tenderness at the surgery site, a large amount of drainage or bleeding, severe pain, foul-smelling drainage, redness, swelling).    2. It has  been over 8 to 10 hours since surgery and you are still not able to urinate (pass water).    3.  Headache for over 24 hours.

## 2024-08-29 NOTE — ANESTHESIA POSTPROCEDURE EVALUATION
Patient: Kingsley Raphael    Procedure: Procedure(s):  EXCISION MASS POSTERIOR LEFT KNEE; EXCISION MASS NECK  EXCISION, MASS, NECK       Anesthesia Type:  MAC    Note:  Disposition: Outpatient   Postop Pain Control: Uneventful            Sign Out: Well controlled pain   PONV: No   Neuro/Psych: Uneventful            Sign Out: Acceptable/Baseline neuro status   Airway/Respiratory: Uneventful            Sign Out: Acceptable/Baseline resp. status   CV/Hemodynamics: Uneventful            Sign Out: Acceptable CV status; No obvious hypovolemia; No obvious fluid overload   Other NRE: NONE   DID A NON-ROUTINE EVENT OCCUR? No           Last vitals:  Vitals Value Taken Time   /78 08/29/24 1315   Temp 96.9  F (36.1  C) 08/29/24 1256   Pulse 78 08/29/24 1323   Resp 16 08/29/24 1256   SpO2 96 % 08/29/24 1323   Vitals shown include unfiled device data.    Electronically Signed By: Boogie Adler MD  August 29, 2024  1:27 PM

## 2024-08-29 NOTE — OP NOTE
Operative Note    Name:  Kingsley Raphael  Location: [facility]  Procedure Date:  8/29/2024  PCP:  No Ref-Primary, Physician    Surgery Performed:  Procedure/Surgery Information   Procedure: Procedure(s):  EXCISION MASS POSTERIOR LEFT KNEE; EXCISION MASS NECK  EXCISION, MASS, NECK   Surgeon(s): Surgeons and Role:     * Rosendo Doss MD - Primary   Specimens: ID Type Source Tests Collected by Time Destination   1 : posterior neck cyst Cyst Neck SURGICAL PATHOLOGY EXAM Rosendo Doss MD 8/29/2024 12:20 PM    2 : posterior left knee mass Tissue Knee, Left SURGICAL PATHOLOGY EXAM Rosendo Doss MD 8/29/2024 12:40 PM                Pre-Procedure Diagnosis:  Soft tissue mass behind the left knee  A cyst on the posterior aspect of the neck    Post-Procedure Diagnosis:    Soft tissue mass behind the knee that measures 4 x 4 cm  A cystic lesion behind the neck that measures 1.5 cm in diameter    Anesthesia:  MAC     Past Medical History:   Diagnosis Date    Calculus of kidney     Motion sickness     PONV (postoperative nausea and vomiting)     Prediabetes        Patient Active Problem List    Diagnosis Date Noted    Lipoma of skin and subcutaneous tissue 07/24/2024     Priority: Medium    Cigarette nicotine dependence without complication 07/16/2022     Priority: Medium    Pre-diabetes 07/15/2022     Priority: Medium       Findings:  There is a 1.5 cm cystic lesion in the subcutaneous tissues behind the neck.  There is a 4 cm soft tissue mass behind the left knee.    Operative Report:    Patient is brought the operating room placed in a prone position given MAC anesthesia sterilely prepped and draped in both locations.  The operative fields were infused with local anesthesia and a timeout process was undertaken.  The neck lesion was addressed first I did a 2 cm elliptical incision around the lesion of concern subtenons tissue was resected through sharply circumferentially coming around the  lesion of concern and removing the cyst from the subcutaneous pocket.  Just before removing the lesion the cyst opened and drained some cream-colored fluid into the wound.  The wound was cleaned it was dried it was addressed with electrocautery to achieve complete hemostasis.  I then swabbed this wound with Betadine and irrigated the wound with normal saline.  I then luis the wound edges together with a 3-0 Vicryl suture and closed the skin with a running 4-0 subcuticular Monocryl suture.    The lesion behind the left knee was addressed with an 8 cm elliptical incision.  The skin over the lesion was quite thin it was peeled down away from the lesion with sharp dissection with 15 blade scalpel.  I was then able to work from medial towards lateral I peeled the deeper aspects of the mass up off of the deeper tissues.  The mass was removed en bloc it measures 4 x 4 cm.  The skin edges were a bit taut and with that I decided to bring them together with permanent sutures.  I did a series of interrupted 3-0 Prolene sutures placed in a vertical mattress fashion to reapproximate the skin edge.  I then dressed that with Telfa and Tegaderm.  I dressed the neck wound with skin glue.  The patient was woken up and taken to recovery there was no complications with the procedure    Estimated Blood Loss:   5 cc       Drains:        Implants:  * No implants in log *    Complications:    None    Rosendo Doss MD     Date: 8/29/2024  Time: 1:09 PM

## 2024-08-29 NOTE — ANESTHESIA PREPROCEDURE EVALUATION
Anesthesia Pre-Procedure Evaluation    Patient: Kingsley Raphael   MRN: [id number] : 1965        Procedure : Procedure(s):  EXCISION, LESION, LOWER EXTREMITY, EXCISION, MASS, NECK  EXCISION, MASS, NECK          Past Medical History:   Diagnosis Date    Calculus of kidney     Prediabetes       Past Surgical History:   Procedure Laterality Date    NO PAST SURGERIES      wisdom teeth      1990, [place]      No Known Allergies   Social History     Tobacco Use    Smoking status: Former     Types: Cigarettes    Smokeless tobacco: Former   Substance Use Topics    Alcohol use: Yes     Comment: Alcoholic Drinks/day: social      Wt Readings from Last 1 Encounters:   24 77.1 kg (170 lb)        Anesthesia Evaluation   Pt has not had prior anesthetic         ROS/MED HX  ENT/Pulmonary:  - neg pulmonary ROS   (+)                tobacco use, Past use,                       Neurologic:  - neg neurologic ROS     Cardiovascular:  - neg cardiovascular ROS     METS/Exercise Tolerance:     Hematologic:  - neg hematologic  ROS     Musculoskeletal:  - neg musculoskeletal ROS     GI/Hepatic:  - neg GI/hepatic ROS     Renal/Genitourinary:  - neg Renal ROS   (+)       Nephrolithiasis ,       Endo:  - neg endo ROS     Psychiatric/Substance Use:  - neg psychiatric ROS     Infectious Disease:  - neg infectious disease ROS     Malignancy:  - neg malignancy ROS     Other:  - neg other ROS          Physical Exam    Airway        Mallampati: II   TM distance: > 3 FB   Neck ROM: full   Mouth opening: > 3 cm    Respiratory Devices and Support         Dental       (+) Minor Abnormalities - some fillings, tiny chips      Cardiovascular   cardiovascular exam normal          Pulmonary   pulmonary exam normal                OUTSIDE LABS:  CBC:   Lab Results   Component Value Date    WBC 5.7 2024    WBC 4.1 2019    HGB 13.9 2024    HGB 14.0 2020    HCT 44.5 2024    HCT 43.4 2019     2024      "07/01/2019     BMP:   Lab Results   Component Value Date     08/21/2024     07/12/2021    POTASSIUM 3.8 08/21/2024    POTASSIUM 4.8 07/12/2021    CHLORIDE 108 (H) 08/21/2024    CHLORIDE 109 (H) 07/12/2021    CO2 24 08/21/2024    CO2 25 07/12/2021    BUN 18.0 08/21/2024    BUN 18 07/12/2021    CR 1.04 08/21/2024    CR 0.98 07/12/2021    GLC 86 08/21/2024    GLC 80 07/12/2021     COAGS: No results found for: \"PTT\", \"INR\", \"FIBR\"  POC: No results found for: \"BGM\", \"HCG\", \"HCGS\"  HEPATIC:   Lab Results   Component Value Date    ALBUMIN 4.0 07/12/2021    PROTTOTAL 7.1 07/12/2021    ALT 41 07/12/2021    AST 38 07/12/2021    ALKPHOS 69 07/12/2021    BILITOTAL 0.4 07/12/2021     OTHER:   Lab Results   Component Value Date    A1C 5.7 (H) 07/15/2022    DEMARCO 8.7 (L) 08/21/2024       Anesthesia Plan    ASA Status:  2    NPO Status:  NPO Appropriate    Anesthesia Type: MAC.     - Reason for MAC: straight local not clinically adequate, immobility needed   Induction: Intravenous, Propofol.   Maintenance: TIVA.        Consents    Anesthesia Plan(s) and associated risks, benefits, and realistic alternatives discussed. Questions answered and patient/representative(s) expressed understanding.     - Discussed:     - Discussed with:  Patient            Postoperative Care    Pain management: Multi-modal analgesia.   PONV prophylaxis: Ondansetron (or other 5HT-3), Dexamethasone or Solumedrol     Comments:    Other Comments: MAC - propofol gtt, versed/fentanyl/ketamine bolus PRN for pain  Decadron/Zofran for antiemesis  Convert to General with LMA if unable to maintain adequate SpO2 on reduced FiO2 (<30%)  Fire precautions  Reviewed anesthetic options and risks. Patient agrees to proceed.              Boogie Adler MD    I have reviewed the pertinent notes and labs in the chart from the past 30 days and (re)examined the patient.  Any updates or changes from those notes are reflected in this note.              # Overweight: " "Estimated body mass index is 26.25 kg/m  as calculated from the following:    Height as of this encounter: 1.714 m (5' 7.48\").    Weight as of this encounter: 77.1 kg (170 lb).      "

## 2024-08-29 NOTE — ANESTHESIA CARE TRANSFER NOTE
Patient: Kingsley Raphael    Procedure: Procedure(s):  EXCISION MASS POSTERIOR LEFT KNEE; EXCISION MASS NECK  EXCISION, MASS, NECK       Diagnosis: Lipoma of skin and subcutaneous tissue [D17.30]  Diagnosis Additional Information: No value filed.    Anesthesia Type:   MAC     Note:    Oropharynx: oropharynx clear of all foreign objects  Level of Consciousness: drowsy  Oxygen Supplementation: nasal cannula  Level of Supplemental Oxygen (L/min / FiO2): 3  Independent Airway: airway patency satisfactory and stable  Dentition: dentition unchanged  Vital Signs Stable: post-procedure vital signs reviewed and stable  Report to RN Given: handoff report given  Patient transferred to: Phase II    Handoff Report: Identifed the Patient, Identified the Reponsible Provider, Reviewed the pertinent medical history, Discussed the surgical course, Reviewed Intra-OP anesthesia mangement and issues during anesthesia, Set expectations for post-procedure period and Allowed opportunity for questions and acknowledgement of understanding      Vitals:  Vitals Value Taken Time   /68 08/29/24 1256   Temp 96.9  F (36.1  C) 08/29/24 1256   Pulse 78    Resp 16 08/29/24 1256   SpO2 95 % 08/29/24 1256       Electronically Signed By: RITA Castillo CRNA  August 29, 2024  12:58 PM

## 2024-09-04 ENCOUNTER — TELEPHONE (OUTPATIENT)
Dept: SURGERY | Facility: CLINIC | Age: 59
End: 2024-09-04
Payer: COMMERCIAL

## 2024-09-04 NOTE — TELEPHONE ENCOUNTER
----- Message from Rosendo Doss sent at 8/31/2024 12:55 PM CDT -----  Claude Wynn,  Good news from the lab and evaluating the 2 lesions we removed.  The lesion from your neck is a cyst which we expected and the lesion from the back your knee is a benign epidermal inclusion cyst.  There is no cancer or cancerous potential associated with these lesions.    The sutures behind her knee will need to be removed and I would asked that he come into our clinic for suture removal but the seeing the 10th and 12 September.    Adriana can you please see that this patient has a follow-up appointment with one of the PAs or one of my partners for suture removal in the second week of September    Rosendo Doss MD  General Surgeon  [facility]  Surgery [address] [address]  Office: [phone]

## 2024-09-04 NOTE — TELEPHONE ENCOUNTER
Called patient and left detailed message for him with pathology results. Provided our office phone number to call back and schedule suture removal follow up with DONNIE (this can be scheduled with RN as well).

## 2024-09-12 ENCOUNTER — OFFICE VISIT (OUTPATIENT)
Dept: SURGERY | Facility: CLINIC | Age: 59
End: 2024-09-12
Payer: COMMERCIAL

## 2024-09-12 DIAGNOSIS — Z48.02 VISIT FOR SUTURE REMOVAL: Primary | ICD-10-CM

## 2024-09-12 PROCEDURE — 99207 PR NO CHARGE NURSE ONLY: CPT

## 2024-09-12 NOTE — PROGRESS NOTES
RN Visit for Suture/Staple Removal    Surgeon: Rosendo Doss MD  Surgery/date: Excision mass posterior left knee on 08/29/2024    Progress: Patient reports he is doing well after procedure. Minimal discomfort at times with posterior left knee incision but no other concerns.    He presents to the clinic today for removal of sutures. The patient has had the sutures in place for 14 days.  There has been no history of infection or drainage.    7 sutures are seen located on the posterior left knee running horizontally. The wound is healing well with no signs of infection and incision has scabbed over. All 7 sutures were easily removed today. Routine wound care discussed and reviewed signs of symptoms of infections. Patient invited to call clinic back with any questions or concerns.    Follow-up: Patient to follow-up PRN.      Cecilia BLUE RN, BSN    [facility]  General Surgery  [address] [place] [id number]  Office: [phone]  Employed by [facility]